# Patient Record
Sex: FEMALE | Race: WHITE | URBAN - METROPOLITAN AREA
[De-identification: names, ages, dates, MRNs, and addresses within clinical notes are randomized per-mention and may not be internally consistent; named-entity substitution may affect disease eponyms.]

---

## 2017-07-18 ENCOUNTER — INPATIENT (INPATIENT)
Facility: HOSPITAL | Age: 82
LOS: 2 days | Discharge: HOME HEALTH | End: 2017-07-21
Attending: SURGERY | Admitting: INTERNAL MEDICINE

## 2017-07-18 DIAGNOSIS — K56.609 UNSPECIFIED INTESTINAL OBSTRUCTION, UNSPECIFIED AS TO PARTIAL VERSUS COMPLETE OBSTRUCTION: ICD-10-CM

## 2017-07-25 DIAGNOSIS — K56.60 UNSPECIFIED INTESTINAL OBSTRUCTION: ICD-10-CM

## 2017-07-25 DIAGNOSIS — E87.1 HYPO-OSMOLALITY AND HYPONATREMIA: ICD-10-CM

## 2017-07-25 DIAGNOSIS — N13.8 OTHER OBSTRUCTIVE AND REFLUX UROPATHY: ICD-10-CM

## 2017-07-25 DIAGNOSIS — I10 ESSENTIAL (PRIMARY) HYPERTENSION: ICD-10-CM

## 2017-07-25 DIAGNOSIS — E03.9 HYPOTHYROIDISM, UNSPECIFIED: ICD-10-CM

## 2017-07-25 DIAGNOSIS — Q43.3 CONGENITAL MALFORMATIONS OF INTESTINAL FIXATION: ICD-10-CM

## 2017-07-25 DIAGNOSIS — E78.00 PURE HYPERCHOLESTEROLEMIA, UNSPECIFIED: ICD-10-CM

## 2017-07-25 DIAGNOSIS — K56.5 INTESTINAL ADHESIONS [BANDS] WITH OBSTRUCTION (POSTINFECTION): ICD-10-CM

## 2017-07-25 DIAGNOSIS — E11.9 TYPE 2 DIABETES MELLITUS WITHOUT COMPLICATIONS: ICD-10-CM

## 2017-07-25 PROBLEM — Z00.00 ENCOUNTER FOR PREVENTIVE HEALTH EXAMINATION: Status: ACTIVE | Noted: 2017-07-25

## 2017-07-28 DIAGNOSIS — K55.041: ICD-10-CM

## 2017-08-03 ENCOUNTER — APPOINTMENT (OUTPATIENT)
Dept: SURGERY | Facility: CLINIC | Age: 82
End: 2017-08-03
Payer: MEDICARE

## 2017-08-03 VITALS
SYSTOLIC BLOOD PRESSURE: 120 MMHG | DIASTOLIC BLOOD PRESSURE: 64 MMHG | WEIGHT: 138 LBS | BODY MASS INDEX: 24.45 KG/M2 | HEIGHT: 63 IN

## 2017-08-03 DIAGNOSIS — Z87.19 PERSONAL HISTORY OF OTHER DISEASES OF THE DIGESTIVE SYSTEM: ICD-10-CM

## 2017-08-03 PROCEDURE — 99024 POSTOP FOLLOW-UP VISIT: CPT | Mod: NC

## 2018-09-11 ENCOUNTER — OUTPATIENT (OUTPATIENT)
Dept: OUTPATIENT SERVICES | Facility: HOSPITAL | Age: 83
LOS: 1 days | Discharge: HOME | End: 2018-09-11

## 2018-09-11 DIAGNOSIS — T82.03XA LEAKAGE OF HEART VALVE PROSTHESIS, INITIAL ENCOUNTER: ICD-10-CM

## 2019-04-30 ENCOUNTER — EMERGENCY (EMERGENCY)
Facility: HOSPITAL | Age: 84
LOS: 0 days | Discharge: HOME | End: 2019-04-30
Attending: EMERGENCY MEDICINE | Admitting: EMERGENCY MEDICINE
Payer: MEDICARE

## 2019-04-30 VITALS
DIASTOLIC BLOOD PRESSURE: 95 MMHG | TEMPERATURE: 96 F | HEART RATE: 73 BPM | SYSTOLIC BLOOD PRESSURE: 179 MMHG | OXYGEN SATURATION: 97 % | RESPIRATION RATE: 18 BRPM

## 2019-04-30 VITALS
HEIGHT: 63 IN | TEMPERATURE: 97 F | RESPIRATION RATE: 18 BRPM | OXYGEN SATURATION: 100 % | DIASTOLIC BLOOD PRESSURE: 84 MMHG | WEIGHT: 136.91 LBS | HEART RATE: 76 BPM | SYSTOLIC BLOOD PRESSURE: 204 MMHG

## 2019-04-30 DIAGNOSIS — Z90.49 ACQUIRED ABSENCE OF OTHER SPECIFIED PARTS OF DIGESTIVE TRACT: Chronic | ICD-10-CM

## 2019-04-30 DIAGNOSIS — I10 ESSENTIAL (PRIMARY) HYPERTENSION: ICD-10-CM

## 2019-04-30 DIAGNOSIS — R10.9 UNSPECIFIED ABDOMINAL PAIN: ICD-10-CM

## 2019-04-30 DIAGNOSIS — K57.92 DIVERTICULITIS OF INTESTINE, PART UNSPECIFIED, WITHOUT PERFORATION OR ABSCESS WITHOUT BLEEDING: ICD-10-CM

## 2019-04-30 DIAGNOSIS — E78.5 HYPERLIPIDEMIA, UNSPECIFIED: ICD-10-CM

## 2019-04-30 LAB
ALBUMIN SERPL ELPH-MCNC: 4.7 G/DL — SIGNIFICANT CHANGE UP (ref 3.5–5.2)
ALP SERPL-CCNC: 57 U/L — SIGNIFICANT CHANGE UP (ref 30–115)
ALT FLD-CCNC: 12 U/L — SIGNIFICANT CHANGE UP (ref 0–41)
ANION GAP SERPL CALC-SCNC: 11 MMOL/L — SIGNIFICANT CHANGE UP (ref 7–14)
APPEARANCE UR: CLEAR — SIGNIFICANT CHANGE UP
AST SERPL-CCNC: 19 U/L — SIGNIFICANT CHANGE UP (ref 0–41)
BASE EXCESS BLDV CALC-SCNC: 3.3 MMOL/L — HIGH (ref -2–2)
BASOPHILS # BLD AUTO: 0.05 K/UL — SIGNIFICANT CHANGE UP (ref 0–0.2)
BASOPHILS NFR BLD AUTO: 0.9 % — SIGNIFICANT CHANGE UP (ref 0–1)
BILIRUB SERPL-MCNC: 0.3 MG/DL — SIGNIFICANT CHANGE UP (ref 0.2–1.2)
BILIRUB UR-MCNC: NEGATIVE — SIGNIFICANT CHANGE UP
BUN SERPL-MCNC: 21 MG/DL — HIGH (ref 10–20)
CA-I SERPL-SCNC: 1.22 MMOL/L — SIGNIFICANT CHANGE UP (ref 1.12–1.3)
CALCIUM SERPL-MCNC: 9.6 MG/DL — SIGNIFICANT CHANGE UP (ref 8.5–10.1)
CHLORIDE SERPL-SCNC: 102 MMOL/L — SIGNIFICANT CHANGE UP (ref 98–110)
CO2 SERPL-SCNC: 28 MMOL/L — SIGNIFICANT CHANGE UP (ref 17–32)
COLOR SPEC: YELLOW — SIGNIFICANT CHANGE UP
CREAT SERPL-MCNC: 0.8 MG/DL — SIGNIFICANT CHANGE UP (ref 0.7–1.5)
DIFF PNL FLD: NEGATIVE — SIGNIFICANT CHANGE UP
EOSINOPHIL # BLD AUTO: 0.06 K/UL — SIGNIFICANT CHANGE UP (ref 0–0.7)
EOSINOPHIL NFR BLD AUTO: 1.1 % — SIGNIFICANT CHANGE UP (ref 0–8)
GAS PNL BLDV: 137 MMOL/L — SIGNIFICANT CHANGE UP (ref 136–145)
GAS PNL BLDV: SIGNIFICANT CHANGE UP
GLUCOSE SERPL-MCNC: 134 MG/DL — HIGH (ref 70–99)
GLUCOSE UR QL: NEGATIVE MG/DL — SIGNIFICANT CHANGE UP
HCO3 BLDV-SCNC: 31 MMOL/L — HIGH (ref 22–29)
HCT VFR BLD CALC: 39.5 % — SIGNIFICANT CHANGE UP (ref 37–47)
HCT VFR BLDA CALC: 42.1 % — SIGNIFICANT CHANGE UP (ref 34–44)
HGB BLD CALC-MCNC: 13.7 G/DL — LOW (ref 14–18)
HGB BLD-MCNC: 12.8 G/DL — SIGNIFICANT CHANGE UP (ref 12–16)
IMM GRANULOCYTES NFR BLD AUTO: 0.5 % — HIGH (ref 0.1–0.3)
KETONES UR-MCNC: NEGATIVE — SIGNIFICANT CHANGE UP
LACTATE BLDV-MCNC: 1.3 MMOL/L — SIGNIFICANT CHANGE UP (ref 0.5–1.6)
LEUKOCYTE ESTERASE UR-ACNC: NEGATIVE — SIGNIFICANT CHANGE UP
LYMPHOCYTES # BLD AUTO: 1.08 K/UL — LOW (ref 1.2–3.4)
LYMPHOCYTES # BLD AUTO: 19.2 % — LOW (ref 20.5–51.1)
MCHC RBC-ENTMCNC: 29.9 PG — SIGNIFICANT CHANGE UP (ref 27–31)
MCHC RBC-ENTMCNC: 32.4 G/DL — SIGNIFICANT CHANGE UP (ref 32–37)
MCV RBC AUTO: 92.3 FL — SIGNIFICANT CHANGE UP (ref 81–99)
MONOCYTES # BLD AUTO: 0.39 K/UL — SIGNIFICANT CHANGE UP (ref 0.1–0.6)
MONOCYTES NFR BLD AUTO: 6.9 % — SIGNIFICANT CHANGE UP (ref 1.7–9.3)
NEUTROPHILS # BLD AUTO: 4.01 K/UL — SIGNIFICANT CHANGE UP (ref 1.4–6.5)
NEUTROPHILS NFR BLD AUTO: 71.4 % — SIGNIFICANT CHANGE UP (ref 42.2–75.2)
NITRITE UR-MCNC: NEGATIVE — SIGNIFICANT CHANGE UP
NRBC # BLD: 0 /100 WBCS — SIGNIFICANT CHANGE UP (ref 0–0)
PCO2 BLDV: 58 MMHG — HIGH (ref 41–51)
PH BLDV: 7.33 — SIGNIFICANT CHANGE UP (ref 7.26–7.43)
PH UR: 6.5 — SIGNIFICANT CHANGE UP (ref 5–8)
PLATELET # BLD AUTO: 214 K/UL — SIGNIFICANT CHANGE UP (ref 130–400)
PO2 BLDV: 16 MMHG — LOW (ref 20–40)
POTASSIUM BLDV-SCNC: 4.7 MMOL/L — SIGNIFICANT CHANGE UP (ref 3.3–5.6)
POTASSIUM SERPL-MCNC: 5.2 MMOL/L — HIGH (ref 3.5–5)
POTASSIUM SERPL-SCNC: 5.2 MMOL/L — HIGH (ref 3.5–5)
PROT SERPL-MCNC: 7.3 G/DL — SIGNIFICANT CHANGE UP (ref 6–8)
PROT UR-MCNC: NEGATIVE MG/DL — SIGNIFICANT CHANGE UP
RBC # BLD: 4.28 M/UL — SIGNIFICANT CHANGE UP (ref 4.2–5.4)
RBC # FLD: 12.6 % — SIGNIFICANT CHANGE UP (ref 11.5–14.5)
SAO2 % BLDV: 20 % — SIGNIFICANT CHANGE UP
SODIUM SERPL-SCNC: 141 MMOL/L — SIGNIFICANT CHANGE UP (ref 135–146)
SP GR SPEC: 1.01 — SIGNIFICANT CHANGE UP (ref 1.01–1.03)
UROBILINOGEN FLD QL: 0.2 MG/DL — SIGNIFICANT CHANGE UP (ref 0.2–0.2)
WBC # BLD: 5.62 K/UL — SIGNIFICANT CHANGE UP (ref 4.8–10.8)
WBC # FLD AUTO: 5.62 K/UL — SIGNIFICANT CHANGE UP (ref 4.8–10.8)

## 2019-04-30 PROCEDURE — 74177 CT ABD & PELVIS W/CONTRAST: CPT | Mod: 26

## 2019-04-30 PROCEDURE — 99284 EMERGENCY DEPT VISIT MOD MDM: CPT

## 2019-04-30 RX ORDER — ACETAMINOPHEN 500 MG
650 TABLET ORAL ONCE
Qty: 0 | Refills: 0 | Status: COMPLETED | OUTPATIENT
Start: 2019-04-30 | End: 2019-04-30

## 2019-04-30 RX ORDER — IOHEXOL 300 MG/ML
30 INJECTION, SOLUTION INTRAVENOUS ONCE
Qty: 0 | Refills: 0 | Status: COMPLETED | OUTPATIENT
Start: 2019-04-30 | End: 2019-04-30

## 2019-04-30 RX ORDER — CIPROFLOXACIN LACTATE 400MG/40ML
1 VIAL (ML) INTRAVENOUS
Qty: 14 | Refills: 0
Start: 2019-04-30 | End: 2019-05-06

## 2019-04-30 RX ORDER — METRONIDAZOLE 500 MG
1 TABLET ORAL
Qty: 21 | Refills: 0
Start: 2019-04-30 | End: 2019-05-06

## 2019-04-30 RX ADMIN — Medication 650 MILLIGRAM(S): at 12:09

## 2019-04-30 RX ADMIN — IOHEXOL 30 MILLILITER(S): 300 INJECTION, SOLUTION INTRAVENOUS at 12:09

## 2019-04-30 NOTE — ED PROVIDER NOTE - OBJECTIVE STATEMENT
85 yo F with hx of diverticulitis s/p partial colon resection in 2017 (Dr. 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 No recent trauma or heavy lifting. No known alleviating or aggravating factors. No associated nausea, vomiting, fever, chills, cp, sob, dysuria, hematuria, diarrhea, constipation. Had normal soft BM earlier this AM prior to onset and has been passing gas. Has not taken anything for pain, states pain is currently improved but still present.

## 2019-04-30 NOTE — ED ADULT NURSE NOTE - OBJECTIVE STATEMENT
patient states woke up this morning had breakfast and later on felt a " cramp" in b/l lower abdomen that lasted few seconds and went away. patient denies abdomen pain at this time. denies n/v/f/d/son/chest pain no blle edema. states has normal bowel movement this morning

## 2019-04-30 NOTE — ED PROVIDER NOTE - CLINICAL SUMMARY MEDICAL DECISION MAKING FREE TEXT BOX
pw low abd pain, CT read as negative, however on my interpretation and with patient symptoms likely diverticulitis or colitis of small segment without complication. Patient to be discharged from ED. Any available test results were discussed with patient and/or family. Verbal instructions given, including instructions to return to ED immediately for any new, worsening, or concerning symptoms. Patient endorsed understanding. Written discharge instructions additionally given, including follow-up plan.

## 2019-04-30 NOTE — ED PROVIDER NOTE - NS ED ROS FT
GEN:  no fever, no chills  NEURO:  no headache, no dizziness  ENT: no sore throat, no runny nose  CV:  no chest pain, no SOB  RESP:  no dyspnea, no cough  GI:  no nausea, no vomiting, + abdominal pain, no diarrhea, no constipation  :  no dysuria, no urinary frequency, no hematuria  MSK:  no joint pain, no edema  SKIN:  no rash, no bruising  HEME: no hematochezia, no melena

## 2019-04-30 NOTE — ED PROVIDER NOTE - IMAGING STUDIES ADDITIONAL INFORMATION FREE TEXT
CT showing extensive diverticulosis with some hyperenhancement of diverticulae by the rectosigmoid jct, without fluid collection or abscess or signs of obstruction.

## 2019-04-30 NOTE — ED PROVIDER NOTE - PHYSICAL EXAMINATION
CONSTITUTIONAL: well developed, nontoxic appearing, in no acute distress, speaking in full sentences  SKIN: warm, dry, no rash, cap refill < 2 seconds  HEENT: normocephalic, atraumatic, no conjunctival erythema, moist mucous membranes, patent airway  NECK: supple, no masses  CV:  regular rate, regular rhythm  RESP: no wheezes, no rales, no rhonchi, normal work of breathing  ABD: soft, periumbilical tenderness, no suprapubic tenderness, nondistended, no rebound, no guarding  BACK: no CVA tenderness  MSK: normal ROM, no cyanosis, no edema  NEURO: alert, oriented, grossly unremarkable  PSYCH: cooperative, appropriate

## 2019-04-30 NOTE — ED PROVIDER NOTE - ATTENDING CONTRIBUTION TO CARE
83 yo F with hx of diverticulitis s/p partial colon resection in 2017 (Dr. Palomares), HTN, HLD, pre-DM, SSC s/p resection, pw low abd pain, sudden onset this morning at 10 am, worsening with standing, not affected by activity or exertion, Has not passed gas since onset though only endured for a short period so far. No black or bloody stools, dysuria, hematuria, diarrhea, vomiting, chest pain, SOB, leg swelling, recent travel, or other complaint.  Exam: NAD, NCAT, HEENT: mmm, EOMI, PERRLA, Neck: supple, nontender, nl ROM, Heart: RRR, no murmur, Lungs: BCTA, no signs of increased WOB, Abd: + LLQ>RLQ abd tenderness to palp, no guarding or rebound, no hernia palpated, no CVAT. MSK: chest, back, and ext nontender, nl rom, no deformity. Neuro: A&Ox3, normal strength, nl sensation throughout, normal speech.  A/P: Eval possible recurrent diverticulitis vs possible bowel obstruction vs other acute intraabdominal pathology. Labs, imaging, fluids, symptom control, reassess.

## 2019-05-01 LAB
CULTURE RESULTS: SIGNIFICANT CHANGE UP
SPECIMEN SOURCE: SIGNIFICANT CHANGE UP

## 2019-12-09 PROBLEM — E78.5 HYPERLIPIDEMIA, UNSPECIFIED: Chronic | Status: ACTIVE | Noted: 2019-04-30

## 2019-12-09 PROBLEM — I10 ESSENTIAL (PRIMARY) HYPERTENSION: Chronic | Status: ACTIVE | Noted: 2019-04-30

## 2020-01-29 ENCOUNTER — OUTPATIENT (OUTPATIENT)
Dept: OUTPATIENT SERVICES | Facility: HOSPITAL | Age: 85
LOS: 1 days | Discharge: HOME | End: 2020-01-29
Payer: MEDICARE

## 2020-01-29 DIAGNOSIS — Z90.49 ACQUIRED ABSENCE OF OTHER SPECIFIED PARTS OF DIGESTIVE TRACT: Chronic | ICD-10-CM

## 2020-01-29 PROCEDURE — 93312 ECHO TRANSESOPHAGEAL: CPT | Mod: 26

## 2020-01-29 PROCEDURE — 93010 ELECTROCARDIOGRAM REPORT: CPT

## 2020-01-29 PROCEDURE — 93320 DOPPLER ECHO COMPLETE: CPT | Mod: 26

## 2020-01-29 PROCEDURE — 93325 DOPPLER ECHO COLOR FLOW MAPG: CPT | Mod: 26

## 2020-01-29 RX ORDER — AMLODIPINE BESYLATE 2.5 MG/1
1 TABLET ORAL
Qty: 0 | Refills: 0 | DISCHARGE

## 2020-01-29 RX ORDER — AMLODIPINE BESYLATE 2.5 MG/1
0 TABLET ORAL
Qty: 0 | Refills: 0 | DISCHARGE

## 2020-01-29 RX ORDER — LEVOTHYROXINE SODIUM 125 MCG
0 TABLET ORAL
Qty: 0 | Refills: 0 | DISCHARGE

## 2020-01-29 RX ORDER — QUINAPRIL HYDROCHLORIDE 40 MG/1
0 TABLET, FILM COATED ORAL
Qty: 0 | Refills: 0 | DISCHARGE

## 2020-01-29 NOTE — CHART NOTE - NSCHARTNOTEFT_GEN_A_CORE
POST OPERATIVE PROCEDURAL DOCUMENTATION  PRE-OP DIAGNOSIS: Aortic stenosis    POST-OP DIAGNOSIS: Mild aortic stenosis    PROCEDURE: Transesophageal echocardiogram    Primary Physician: Dr. Osborne  Assistant: Clotilde    ANESTHESIA TYPE  [  ] General Anesthesia  [ x ] Conscious Sedation  [  ] Local/Regional    CONDITION  [  ] Critical  [  ] Serious  [  ] Fair  [x] Good    SPECIMENS REMOVED (IF APPLICABLE): N/A    IMPLANTS (IF APPLICABLE): None    ESTIMATED BLOOD LOSS: None    COMPLICATIONS: None      FINDINGS:    After risks and benefits of procedures were explained, informed consent was obtained and placed in chart. Refer to Anesthesia note for sedation details.  The DARYN probe was passed into the esophagus without difficulty.  Transesophageal and transgastric images were obtained.  The DARYN probe was removed without difficulty and examined.  There was no evidence for bleeding.  The patient tolerated the procedure well without any immediate DARYN-related complications.      Preliminary Findings:  LA and RA: Enlarged.  LV: LVEF was estimated at 55-65%  MV: Severely thickened and calcified mitral valve leaflets. Calcified annulus. Mild MR. No evidence for MS.   AV: Thickened, calcified trileaflet. Mild AI, Mild AS. Aortic valve area 1.6 cm^2 by planimetry.  TV: Mild TR.  PV: Trace PI.   IAS: no PFO. NO R-> L shunt on color doppler or   There was mild, non-mobile atheroma seen in the thoracic aorta.     DIAGNOSIS/IMPRESSION:    PLAN OF CARE:  [x] Continue with current medications  [x] Discharge home when stable and awake POST OPERATIVE PROCEDURAL DOCUMENTATION  PRE-OP DIAGNOSIS: Aortic stenosis    POST-OP DIAGNOSIS: Mild aortic stenosis    PROCEDURE: Transesophageal echocardiogram and transthoracic echocardiogram to measure gradients.    Primary Physician: Dr. Osborne  Assistant: Clotilde    ANESTHESIA TYPE  [  ] General Anesthesia  [ x ] Conscious Sedation  [  ] Local/Regional    CONDITION  [  ] Critical  [  ] Serious  [  ] Fair  [x] Good    SPECIMENS REMOVED (IF APPLICABLE): N/A    IMPLANTS (IF APPLICABLE): None    ESTIMATED BLOOD LOSS: None    COMPLICATIONS: None      FINDINGS:    After risks and benefits of procedures were explained, informed consent was obtained and placed in chart. Refer to Anesthesia note for sedation details.  The DARYN probe was passed into the esophagus without difficulty.  Transesophageal and transgastric images were obtained.  The DARYN probe was removed without difficulty and examined.  There was no evidence for bleeding.  The patient tolerated the procedure well without any immediate DARYN-related complications.      Preliminary Findings:  LA and RA: Enlarged.  LV: LVEF was estimated at 55-65%  MV: Severely thickened and calcified mitral valve leaflets. Calcified annulus. Mild MR. No evidence for MS.   AV: Thickened, calcified trileaflet. Mild AI. Mild AS by aortic valve area 1.6 cm^2 by planimetry and 1.6cm^2 by VTI. Aortic gradient mean 12.4 mmHg, peak 23.90 mmHg.  TV: Mild TR.  PV: Trace PI.   IAS: No PFO. NO R-> L shunt on color doppler and injection of agitated saline contrast.  There was mild, non-mobile atheroma seen in the thoracic aorta.     DIAGNOSIS/IMPRESSION: Mild aortic stenosis.     PLAN OF CARE:  [x] Continue with current medications  [x] Discharge home when stable and awake POST OPERATIVE PROCEDURAL DOCUMENTATION  PRE-OP DIAGNOSIS: Aortic stenosis    POST-OP DIAGNOSIS: Mild aortic stenosis    PROCEDURE: Transesophageal echocardiogram and transthoracic echocardiogram to measure gradients.    Primary Physician: Dr. Osborne  Assistant: Clotilde    ANESTHESIA TYPE  [  ] General Anesthesia  [ x ] Conscious Sedation  [  ] Local/Regional    CONDITION  [  ] Critical  [  ] Serious  [  ] Fair  [x] Good    SPECIMENS REMOVED (IF APPLICABLE): N/A    IMPLANTS (IF APPLICABLE): None    ESTIMATED BLOOD LOSS: None    COMPLICATIONS: None      FINDINGS:    After risks and benefits of procedures were explained, informed consent was obtained and placed in chart. Refer to Anesthesia note for sedation details.  The DARYN probe was passed into the esophagus without difficulty.  Transesophageal and transgastric images were obtained.  The DARYN probe was removed without difficulty and examined.  There was no evidence for bleeding.  The patient tolerated the procedure well without any immediate DARYN-related complications.      Preliminary Findings:  LA and RA: Enlarged.  LV: LVEF was estimated at 55-65%  MV: Severely thickened and calcified mitral valve leaflets. Calcified annulus. Mild MR. No evidence for MS.   AV: Thickened, calcified trileaflet. Mild AI. Mild AS by aortic valve area 1.6 cm^2 by planimetry and 1.6cm^2 by VTI. Aortic gradient mean 12.4 mmHg, peak 23.90 mmHg.  TV: Mild TR.  PV: Trace PI.   IAS: No PFO. NO R-> L shunt on color doppler and injection of agitated saline contrast.  There was mild, non-mobile atheroma seen in the thoracic aorta.     DIAGNOSIS/IMPRESSION: Mild aortic stenosis.     PLAN OF CARE:  [x] Continue with current medications.  [x] Discharge home when stable and awake POST OPERATIVE PROCEDURAL DOCUMENTATION  PRE-OP DIAGNOSIS: Aortic stenosis    POST-OP DIAGNOSIS: Mild aortic stenosis    PROCEDURE: Transesophageal echocardiogram and transthoracic echocardiogram to measure gradients.    Primary Physician: Dr. Osborne  Assistant: Clotilde    ANESTHESIA TYPE  [  ] General Anesthesia  [ x ] Conscious Sedation  [  ] Local/Regional    CONDITION  [  ] Critical  [  ] Serious  [  ] Fair  [x] Good    SPECIMENS REMOVED (IF APPLICABLE): N/A    IMPLANTS (IF APPLICABLE): None    ESTIMATED BLOOD LOSS: None    COMPLICATIONS: None      FINDINGS:    After risks and benefits of procedures were explained, informed consent was obtained and placed in chart. Refer to Anesthesia note for sedation details.  The DARYN probe was passed into the esophagus without difficulty.  Transesophageal and transgastric images were obtained.  The DARYN probe was removed without difficulty and examined.  There was no evidence for bleeding.  The patient tolerated the procedure well without any immediate DARYN-related complications.      Preliminary Findings:  LA and RA: Enlarged.  LV: LVEF was estimated at 55-65%  MV: Severely thickened and calcified mitral valve leaflets. Calcified annulus. Mild MR. No evidence for MS.   AV: Degenerative, thickened, calcified trileaflet. Mild AI. Mild AS by aortic valve area 1.6 cm^2 by planimetry and 1.6cm^2 by VTI. Aortic gradient mean 12.4 mmHg, peak 23.90 mmHg.  TV: Mild TR.  PV: Trace PI.   IAS: No PFO. NO R-> L shunt on color doppler and injection of agitated saline contrast.  There was mild, non-mobile atheroma seen in the thoracic aorta.     DIAGNOSIS/IMPRESSION: Mild aortic stenosis.     PLAN OF CARE:  [x] Continue with current medications.  [x] Discharge home when stable and awake POST OPERATIVE PROCEDURAL DOCUMENTATION  PRE-OP DIAGNOSIS: Aortic stenosis    POST-OP DIAGNOSIS: Mild aortic stenosis. Mild aortic regurgitation. Mild mitral regurgitation.    PROCEDURE: Transesophageal echocardiogram and transthoracic echocardiogram to measure gradients.    Primary Physician: Dr. Osborne  Assistant: Clotilde    ANESTHESIA TYPE  [  ] General Anesthesia  [ x ] Conscious Sedation  [  ] Local/Regional    CONDITION  [  ] Critical  [  ] Serious  [  ] Fair  [x] Good    SPECIMENS REMOVED (IF APPLICABLE): N/A    IMPLANTS (IF APPLICABLE): None    ESTIMATED BLOOD LOSS: None    COMPLICATIONS: None      FINDINGS:    After risks and benefits of procedures were explained, informed consent was obtained and placed in chart. Refer to Anesthesia note for sedation details.  The DARYN probe was passed into the esophagus without difficulty.  Transesophageal and transgastric images were obtained.  The DARYN probe was removed without difficulty and examined.  There was no evidence for bleeding.  The patient tolerated the procedure well without any immediate DARYN-related complications.      Preliminary Findings:  LA and RA: Enlarged.  LV: LVEF was estimated at 55-65%  MV: Severely thickened and calcified mitral valve leaflets. Calcified annulus. Mild MR. No evidence for MS.   AV: Degenerative, thickened, calcified trileaflet. Mild AI. Mild AS by aortic valve area 1.6 cm^2 by planimetry and 1.6cm^2 by VTI. Aortic gradient mean 12.4 mmHg, peak 23.90 mmHg.  TV: Mild TR.  PV: Trace PI.   IAS: No PFO. NO R-> L shunt on color doppler and injection of agitated saline contrast.  There was mild, non-mobile atheroma seen in the thoracic aorta.     DIAGNOSIS/IMPRESSION: Mild aortic stenosis. Mild aortic regurgitation. Mild mitral regurgitation.    PLAN OF CARE:  [x] Continue with current medications.  [x] Discharge home when stable and awake. POST OPERATIVE PROCEDURAL DOCUMENTATION  PRE-OP DIAGNOSIS: Aortic stenosis    POST-OP DIAGNOSIS: Mild aortic stenosis. Mild aortic regurgitation. Mild mitral regurgitation.    PROCEDURE: Transesophageal echocardiogram and transthoracic echocardiogram to measure gradients.    Primary Physician: Dr. Osborne  Assistant: Clotilde    ANESTHESIA TYPE  [  ] General Anesthesia  [ x ] Conscious Sedation  [  ] Local/Regional    CONDITION  [  ] Critical  [  ] Serious  [  ] Fair  [x] Good    SPECIMENS REMOVED (IF APPLICABLE): N/A    IMPLANTS (IF APPLICABLE): None    ESTIMATED BLOOD LOSS: None    COMPLICATIONS: None      FINDINGS:    After risks and benefits of procedures were explained, informed consent was obtained and placed in chart. Refer to Anesthesia note for sedation details.  The DARYN probe was passed into the esophagus without difficulty.  Transesophageal and transgastric images were obtained.  The DARYN probe was removed without difficulty and examined.  There was no evidence for bleeding.  The patient tolerated the procedure well without any immediate DARYN-related complications.      Preliminary Findings:  LA and RA: Enlarged.  LV: LVEF was estimated at 55-65%  MV: Severely thickened and calcified mitral valve leaflets. Calcified annulus. Mild MR. No evidence for MS.   AV: Degenerative, thickened, calcified trileaflet. Mild AI. Mild AS by aortic valve area 1.6 cm^2 by planimetry and 1.6cm^2 by continuity equation. Aortic gradient mean 12.4 mmHg, peak 23.90 mmHg.  TV: Mild TR.  PV: Trace PI.   IAS: No PFO. NO R-> L shunt on color doppler and injection of agitated saline contrast.  There was mild, non-mobile atheroma seen in the thoracic aorta.     DIAGNOSIS/IMPRESSION: Mild aortic stenosis. Mild aortic regurgitation. Mild mitral regurgitation.    PLAN OF CARE:  [x] Continue with current medications.  [x] Discharge home when stable and awake.

## 2020-01-29 NOTE — H&P CARDIOLOGY - HISTORY OF PRESENT ILLNESS
83 y/o F with DLD, HTN, Hypothyroidism, SCC over ear s/p resection, h/o diveriticulitis s/p bowel resection, h/o aortic stenosis, recent fall vs. syncope in store parking lot presented for DARYN for further evaluation of aortic valve    Hct 40.5  Cr 0.92  EKG   Normal sinus rhythm 83 y/o F with DLD, HTN, Hypothyroidism, SCC over ear s/p resection, h/o diveriticulitis s/p bowel resection, h/o aortic stenosis, recent fall vs. syncope in store parking lot presented for DARYN for further evaluation of aortic valve.    Hct 40.5  Cr 0.92  EKG   Normal sinus rhythm

## 2020-01-30 DIAGNOSIS — I35.0 NONRHEUMATIC AORTIC (VALVE) STENOSIS: ICD-10-CM

## 2020-03-10 ENCOUNTER — APPOINTMENT (OUTPATIENT)
Dept: CARDIOLOGY | Facility: CLINIC | Age: 85
End: 2020-03-10
Payer: MEDICARE

## 2020-03-10 VITALS
WEIGHT: 138 LBS | HEIGHT: 63 IN | DIASTOLIC BLOOD PRESSURE: 70 MMHG | HEART RATE: 65 BPM | SYSTOLIC BLOOD PRESSURE: 108 MMHG | BODY MASS INDEX: 24.45 KG/M2

## 2020-03-10 DIAGNOSIS — Z86.39 PERSONAL HISTORY OF OTHER ENDOCRINE, NUTRITIONAL AND METABOLIC DISEASE: ICD-10-CM

## 2020-03-10 DIAGNOSIS — Z85.828 PERSONAL HISTORY OF OTHER MALIGNANT NEOPLASM OF SKIN: ICD-10-CM

## 2020-03-10 DIAGNOSIS — Z82.49 FAMILY HISTORY OF ISCHEMIC HEART DISEASE AND OTHER DISEASES OF THE CIRCULATORY SYSTEM: ICD-10-CM

## 2020-03-10 PROBLEM — E03.9 HYPOTHYROIDISM, UNSPECIFIED: Chronic | Status: ACTIVE | Noted: 2020-01-29

## 2020-03-10 PROCEDURE — 99204 OFFICE O/P NEW MOD 45 MIN: CPT

## 2020-03-10 PROCEDURE — 93228 REMOTE 30 DAY ECG REV/REPORT: CPT

## 2020-03-10 PROCEDURE — 93000 ELECTROCARDIOGRAM COMPLETE: CPT | Mod: 59

## 2020-03-10 NOTE — END OF VISIT
[FreeTextEntry3] : I was present with the NP/PA during the history and exam of the patient. I discussed patient's management with her in details.I reviewed the NP’s note and agree with the documented findings and plan of care. I would like to take this opportunity to thank you for involving me in this patient care. Please do not hesitate to contact me if you have any further questions at [phone].\par  [>50% of Time Spent on Counseling for ____] : Greater than 50% of the encounter time was spent on counseling for [unfilled] [Time Spent: ___ minutes] : I have spent [unfilled] minutes of face to face time with the patient

## 2020-03-10 NOTE — PHYSICAL EXAM
[General Appearance - Well Developed] : well developed [Normal Appearance] : normal appearance [Well Groomed] : well groomed [General Appearance - Well Nourished] : well nourished [No Deformities] : no deformities [General Appearance - In No Acute Distress] : no acute distress [Normal Conjunctiva] : the conjunctiva exhibited no abnormalities [Normal Oral Mucosa] : normal oral mucosa [Heart Rate And Rhythm] : heart rate and rhythm were normal [] : no respiratory distress [Bowel Sounds] : normal bowel sounds [Abnormal Walk] : normal gait [Nail Clubbing] : no clubbing of the fingernails [Skin Color & Pigmentation] : normal skin color and pigmentation [Oriented To Time, Place, And Person] : oriented to person, place, and time [No Anxiety] : not feeling anxious [FreeTextEntry1] : no jvd

## 2020-03-10 NOTE — HISTORY OF PRESENT ILLNESS
[FreeTextEntry1] : Referring Cardiologist: Dr. Daniel Abbasi\par Neuro: Dr. Mccullough\par \par To Establish care\par H/o fall in Sept not sure if syncopal or mechanical \par found to have PVC in the 24 hours event monitor\par She has no chest pain, no shortness of breath, no dyspnea on exertion, no orthopnea, no PND. She denies dizziness. She has no exertional symptoms. She presents for evaluation.\par \par \par CARDIAC TESTING:\par ECG (3/10/2020) 80 sinus rhythm GA 156ms, QRS 80ms, QTC 428ms\par ECHO (1/9/2020) LVEF 55-65% Aortic stenosis\par DARYN ( 1/29/2020) AO stenosis 1.3 valve area\par As per note from Dr Abbasi:\par CT of the head  - showed no acute pathology\par 24 hour holter - showed PVC\par Carotid duplex - negative for carotid stenosis\par Persantine -Thallium nuclear stress test - negative

## 2020-03-10 NOTE — REASON FOR VISIT
[Family Member] : family member [Other: _____] : [unfilled] [Initial Evaluation] : an initial evaluation of [Syncope] : syncope

## 2020-03-10 NOTE — DISCUSSION/SUMMARY
[FreeTextEntry1] : \par 85 years old well appearing woman with PMH HTN, Hypothyroidism, SCC of the Left ear s/p resection ,\par LDL, diverticulitis s/p bowel resection who in September 2019 had fallen, not sure if its syncopal or mechanical. Patient denied LOC, report she tripped and she got up right away with a broken tooth.She had seen Dr. Abbasi and fitted her with 24hr holter. \par \par \par Patient had 24 hours holter that showed PVCs\par Since she has no symptoms and cause of fall is unknown, I recommend a 4 weeks event monitor to evaluate  the etiology of her fall I educated the patient on the use of symptoms’ trigger feature of the event monitor. I will reassess patient after completion of the 4 weeks event monitor.\par \par Patient and daughter was given opportunity to ask questions and were answered to their satisfaction.\par \par RTO in 3 months\par \par I have also advised the patient to go to the nearest emergency room if he experiences any chest pain, dyspnea, syncope, or has any other compelling symptoms.\par \par

## 2020-03-16 ENCOUNTER — APPOINTMENT (OUTPATIENT)
Dept: UROGYNECOLOGY | Facility: CLINIC | Age: 85
End: 2020-03-16

## 2020-06-23 ENCOUNTER — APPOINTMENT (OUTPATIENT)
Dept: CARDIOLOGY | Facility: CLINIC | Age: 85
End: 2020-06-23
Payer: MEDICARE

## 2020-06-23 ENCOUNTER — APPOINTMENT (OUTPATIENT)
Dept: CARDIOLOGY | Facility: CLINIC | Age: 85
End: 2020-06-23

## 2020-06-23 VITALS
HEIGHT: 63 IN | BODY MASS INDEX: 22.86 KG/M2 | HEART RATE: 71 BPM | DIASTOLIC BLOOD PRESSURE: 69 MMHG | SYSTOLIC BLOOD PRESSURE: 112 MMHG | TEMPERATURE: 98.1 F | WEIGHT: 129 LBS

## 2020-06-23 DIAGNOSIS — I49.3 VENTRICULAR PREMATURE DEPOLARIZATION: ICD-10-CM

## 2020-06-23 DIAGNOSIS — I49.1 ATRIAL PREMATURE DEPOLARIZATION: ICD-10-CM

## 2020-06-23 DIAGNOSIS — Z78.9 OTHER SPECIFIED HEALTH STATUS: ICD-10-CM

## 2020-06-23 DIAGNOSIS — E78.5 HYPERLIPIDEMIA, UNSPECIFIED: ICD-10-CM

## 2020-06-23 DIAGNOSIS — I10 ESSENTIAL (PRIMARY) HYPERTENSION: ICD-10-CM

## 2020-06-23 PROCEDURE — 99214 OFFICE O/P EST MOD 30 MIN: CPT

## 2020-06-23 PROCEDURE — 93000 ELECTROCARDIOGRAM COMPLETE: CPT

## 2020-06-23 RX ORDER — ASPIRIN 81 MG
81 TABLET, DELAYED RELEASE (ENTERIC COATED) ORAL DAILY
Refills: 0 | Status: ACTIVE | COMMUNITY

## 2020-06-23 RX ORDER — LEVOTHYROXINE SODIUM 0.05 MG/1
50 TABLET ORAL DAILY
Refills: 0 | Status: ACTIVE | COMMUNITY

## 2020-08-02 PROBLEM — Z78.9 NON-SMOKER: Status: ACTIVE | Noted: 2020-08-02

## 2020-08-02 PROBLEM — E78.5 HYPERLIPIDEMIA, UNSPECIFIED HYPERLIPIDEMIA TYPE: Status: ACTIVE | Noted: 2020-08-02

## 2020-08-02 PROBLEM — I49.1 APC (ATRIAL PREMATURE CONTRACTIONS): Status: ACTIVE | Noted: 2020-08-02

## 2020-08-02 PROBLEM — I49.3 PVC (PREMATURE VENTRICULAR CONTRACTION): Status: ACTIVE | Noted: 2020-03-10

## 2020-08-02 PROBLEM — Z78.9 DOES NOT USE ILLICIT DRUGS: Status: ACTIVE | Noted: 2020-08-02

## 2020-08-02 RX ORDER — AMLODIPINE BESYLATE 5 MG/1
5 TABLET ORAL
Refills: 0 | Status: ACTIVE | COMMUNITY

## 2020-08-02 RX ORDER — PRAVASTATIN SODIUM 20 MG/1
20 TABLET ORAL DAILY
Refills: 0 | Status: ACTIVE | COMMUNITY

## 2020-08-02 NOTE — PHYSICAL EXAM
[General Appearance - Well Developed] : well developed [Well Groomed] : well groomed [Normal Appearance] : normal appearance [General Appearance - In No Acute Distress] : no acute distress [No Deformities] : no deformities [General Appearance - Well Nourished] : well nourished [Normal Conjunctiva] : the conjunctiva exhibited no abnormalities [Normal Oral Mucosa] : normal oral mucosa [] : no respiratory distress [Heart Rate And Rhythm] : heart rate and rhythm were normal [Bowel Sounds] : normal bowel sounds [Nail Clubbing] : no clubbing of the fingernails [Abnormal Walk] : normal gait [Skin Color & Pigmentation] : normal skin color and pigmentation [Oriented To Time, Place, And Person] : oriented to person, place, and time [No Anxiety] : not feeling anxious [FreeTextEntry1] : no jvd

## 2020-08-02 NOTE — DISCUSSION/SUMMARY
[FreeTextEntry1] : Ms. Snow Juan is a pleasant 85 years old well appearing woman with hypertension,hyperlipidemia, hypothyroidism, SCC of the Left ear s/p resection, LDL, diverticulitis s/p bowel resection who in September 2019 had fallen, not sure if its syncopal or mechanical. Patient denied LOC, report she tripped and she got up right away with a broken tooth. She had seen Dr. Abbasi and fitted her with 24hr holter. \par MCOT showed MCOT (3/10/2020 to 4/8/2020): 21 days monitored, average HR 67 bpm (range  bpm), no AF, no SVT, no pause, no VT, PVCs 2%, APCs <1%\par \par \par Patient had 24 hours holter that showed PVCs.\par I reviewed with patient the results of 30 days event monitor and I reassured her.\par \par Since the 30 days event monitor yield no result leading to the cause of syncope/fall\par I recommend an ILR implant for this patient for long term detection of atrial fibrillation. I discussed an ILR implantation with the patient in great detail. I discussed benefits such as monitoring the heart rate and rhythm for a prolonged period of time which could identify causes of ischemic changes on brain MRI and assist in establishing a diagnosis for future management and treatment. In addition, ILR implantation procedure as well as risks such as infection, bleeding and sensitivity to cardiac monitor material was discussed. Ample time was provided for questions/answers. Patient has expressed that he would like to move forward with an ILR implant. He was notified that our office will contact him to confirm scheduling date . I discussed remote monitoring and follow up device interrogations as well.\par \par Patient want to go ahead with the implant. July 15, 2020 was secured. The process for pre- op was discussed with patient and daughter; Lis \par \par \par I have also advised the patient to go to the nearest emergency room if he experiences any chest pain, dyspnea, syncope, or has any other compelling symptoms.\par \par

## 2020-08-02 NOTE — REASON FOR VISIT
[Family Member] : family member [Syncope] : syncope [Follow-Up - Clinic] : a clinic follow-up of [FreeTextEntry1] : discuss result of event monitor

## 2020-08-02 NOTE — HISTORY OF PRESENT ILLNESS
[FreeTextEntry1] : Referring Cardiologist: Dr. Daniel Abbasi\par Neuro: Dr. Mccullough\par PCP: Dr. Almanza\par \par Follow visit to discuss result of holter monitor\par H/o fall in Sept not sure if syncopal or mechanical \par found to have PVC in the 24 hours event monitor\par She has no chest pain, no shortness of breath, no dyspnea on exertion, no orthopnea, no PND. She denies dizziness. She has no exertional symptoms. \par \par CARDIAC TESTING:\par ECG ( 6/23/2020) 71bpm sinus rhythm ME 156ms, QRS 82ms, QTC 400ms\par ECG (3/10/2020) 80 sinus rhythm ME 156ms, QRS 80ms, QTC 428ms\par MCOT (3/10/2020 to 4/8/2020): 21 days monitored, average HR 67 bpm (range  bpm), no AF, no SVT, no pause, no VT, PVCs 2%, APCs <1%\par ECHO (1/9/2020) LVEF 55-65% Aortic stenosis\par DARYN ( 1/29/2020) AO stenosis 1.3 valve area\par \par As per note from Dr Abbasi:\par CT of the head  - showed no acute pathology\par 24 hour holter - showed PVC\par Carotid duplex - negative for carotid stenosis\par Persantine -Thallium nuclear stress test - negative

## 2020-10-19 NOTE — ED ADULT NURSE NOTE - CCCP TRG CHIEF CMPLNT
REASON FOR VISIT:  Fissure in the left thumb nail plate        INTERVAL EVENTS:  The patient has been evaluated in the [facility] by Mary Ellen Colmenares, nurse practitioner.  Several months ago the patient noticed a spontaneous development of a crack in a longitudinal fashion down the length of the nail plate.  She had some tenderness under the nail bed as well.  There is concern for possible glomus tumor as a digital mucous cyst was not palpable or visible.  Patient had an MRI study performed at an outside to shin.  Were not able to visualize the images but the report states that no mass or glomus tumor was identified within the nail bed or distal aspect of the left thumb.  In the interim, the patient states that nail plate seems to be growing normally at this point.  The area of cracking is only the distal 3-4 mm of the nail plate.  Everything proximal to that seems be normal.  The patient does not have pain.  She has not had any discoloration of the nail plate or the underlying nail bed.  She has not had any cyst like drainage.    EXAM:  Examination of the left thumb demonstrates skin that is warm and dry.  No soft tissue swelling or mass.  Certainly no cyst surrounding the interphalangeal joint.  The patient has a nail plate has a slight fissure in its mid aspect at the very distal tip.  The proximal portion of the nail plate appears normal.  No discoloration or swelling underneath the nail plate.  Full range of motion without discomfort.    ASSESSMENT:  History of left thumb nail plate fissure that is now resolved.  No other abnormalities in terms of symptoms or clinical examination.    PLAN:  I recommend the patient follow-up as needed if additional nail plate deformities, pain, discoloration or drainage occur.       abdominal pain

## 2021-10-06 PROBLEM — I10 ESSENTIAL HYPERTENSION: Status: ACTIVE | Noted: 2020-08-02

## 2021-12-14 ENCOUNTER — EMERGENCY (EMERGENCY)
Facility: HOSPITAL | Age: 86
LOS: 0 days | Discharge: HOME | End: 2021-12-14
Attending: EMERGENCY MEDICINE | Admitting: EMERGENCY MEDICINE
Payer: MEDICARE

## 2021-12-14 VITALS
HEART RATE: 63 BPM | SYSTOLIC BLOOD PRESSURE: 128 MMHG | DIASTOLIC BLOOD PRESSURE: 60 MMHG | TEMPERATURE: 98 F | WEIGHT: 134.92 LBS | HEIGHT: 63 IN | OXYGEN SATURATION: 97 % | RESPIRATION RATE: 16 BRPM

## 2021-12-14 DIAGNOSIS — Z91.013 ALLERGY TO SEAFOOD: ICD-10-CM

## 2021-12-14 DIAGNOSIS — E03.9 HYPOTHYROIDISM, UNSPECIFIED: ICD-10-CM

## 2021-12-14 DIAGNOSIS — E78.5 HYPERLIPIDEMIA, UNSPECIFIED: ICD-10-CM

## 2021-12-14 DIAGNOSIS — I10 ESSENTIAL (PRIMARY) HYPERTENSION: ICD-10-CM

## 2021-12-14 DIAGNOSIS — N81.4 UTEROVAGINAL PROLAPSE, UNSPECIFIED: ICD-10-CM

## 2021-12-14 DIAGNOSIS — Z91.010 ALLERGY TO PEANUTS: ICD-10-CM

## 2021-12-14 DIAGNOSIS — M54.50 LOW BACK PAIN, UNSPECIFIED: ICD-10-CM

## 2021-12-14 DIAGNOSIS — Z79.82 LONG TERM (CURRENT) USE OF ASPIRIN: ICD-10-CM

## 2021-12-14 DIAGNOSIS — Z90.49 ACQUIRED ABSENCE OF OTHER SPECIFIED PARTS OF DIGESTIVE TRACT: ICD-10-CM

## 2021-12-14 DIAGNOSIS — E11.9 TYPE 2 DIABETES MELLITUS WITHOUT COMPLICATIONS: ICD-10-CM

## 2021-12-14 DIAGNOSIS — Z90.49 ACQUIRED ABSENCE OF OTHER SPECIFIED PARTS OF DIGESTIVE TRACT: Chronic | ICD-10-CM

## 2021-12-14 PROCEDURE — 99284 EMERGENCY DEPT VISIT MOD MDM: CPT

## 2021-12-14 NOTE — ED PROVIDER NOTE - ATTENDING CONTRIBUTION TO CARE
patient with several years of uterine prolapse without any complaints. now lives with daughter who first noticed her prolapse so brought her here for evaluation, patient has no complaints, no urinary or defecation troubles. has not noticed it herself get bigger or smaller. She also endorse left lower back pain for months without aggravating or alleviating factors. at this moment she has no pain and no associated symptoms. exam shows prolapse that is reducible without erythema or tenderness, no discharge, cn 2-12 intact, gait is nml, strength and sensation nml patient to fu with GYN for further management.

## 2021-12-14 NOTE — ED PROVIDER NOTE - OBJECTIVE STATEMENT
87 yo F with PMH of HTN, HLD, DM, diverticulitis s/p partial colon resection 2017 presenting for uterine prolapse and back pain. Per patient, prolapse has been present without issues for ~20 years, but daughter just noticed this weekend when she moved in with her. Denies incontinence/retention, dysuria, hematuria, melena, hematochezia, NVD, abdominal pain, fever. Back pain is sharp, intermittent, R lower lumbar, non-radiating. No trauma, numbness, weakness, tingling, headache, vision changes, dizziness, CP, SOB.

## 2021-12-14 NOTE — ED PROVIDER NOTE - PATIENT PORTAL LINK FT
You can access the FollowMyHealth Patient Portal offered by [facility] by registering at the following website: [email or web address]. By joining [facility]’s FollowMyHealth portal, you will also be able to view your health information using other applications (apps) compatible with our system.

## 2021-12-14 NOTE — ED PROVIDER NOTE - CLINICAL SUMMARY MEDICAL DECISION MAKING FREE TEXT BOX
evaluated for uterine prolapse. patient without any complaints, patient to fu with GYN as outpatient.  for back pain likely msk patient to fu with PCP

## 2021-12-14 NOTE — ED ADULT TRIAGE NOTE - CHIEF COMPLAINT QUOTE
Prolapsed uterus for years, pts daughter states it looks like there is an ulcer on it. Pt complains of back pain.

## 2021-12-14 NOTE — ED PROVIDER NOTE - NSFOLLOWUPINSTRUCTIONS_ED_ALL_ED_FT
Back Pain    Back pain is very common in adults. The cause of back pain is rarely dangerous and the pain often gets better over time. The cause of your back pain may not be known and may include strain of muscles or ligaments, degeneration of the spinal disks, or arthritis. Occasionally the pain may radiate down your leg(s). Over-the-counter medicines to reduce pain and inflammation are often the most helpful. Stretching and remaining active frequently helps the healing process.     SEEK IMMEDIATE MEDICAL CARE IF YOU HAVE ANY OF THE FOLLOWING SYMPTOMS: bowel or bladder control problems, unusual weakness or numbness in your arms or legs, nausea or vomiting, abdominal pain, fever, dizziness/lightheadedness.      Uterine Prolapse    WHAT YOU NEED TO KNOW:    A uterine prolapse is a condition that causes your uterus to slip down into your vagina. Prolapse can happen if the tissues and muscles supporting your uterus become weak or damaged.             DISCHARGE INSTRUCTIONS:    Seek care immediately if:   •You have bleeding from your vagina that does not stop.      •You have a mass coming out of your vagina that you cannot push back in.      •You are unable to urinate or have a bowel movement.      •You have severe abdominal pain.      Call your doctor or gynecologist if:   •You are leaking urine or bowel movement.      •You have a fever.      •You have foul-smelling fluid coming from your vagina.      •You see blood coming from your vagina that is not from your monthly period.      •You have questions or concerns about your condition or care.      Medicines:   •Estrogen may help strengthen the pelvic muscles and keep the prolapse from getting worse. This may be taken as a pill, applied as a cream, or inserted into your vagina.       •Take your medicine as directed. Contact your healthcare provider if you think your medicine is not helping or if you have side effects. Tell him or her if you are allergic to any medicine. Keep a list of the medicines, vitamins, and herbs you take. Include the amounts, and when and why you take them. Bring the list or the pill bottles to follow-up visits. Carry your medicine list with you in case of an emergency.      Pessary care: A pessary is a rubber device shaped like a donut. It helps to hold your uterus in place. If your gynecologist fits you for a pessary, you will need to remove and clean it regularly. You will be taught when and how to clean the pessary.    Manage your symptoms:   •Sit with your legs elevated. This can help relieve pain or discomfort. Put pillows or blankets under your ankles to elevate your entire legs.      •Do Kegel exercises. These exercises strengthen the muscles that hold your uterus in place. They also tighten the muscles you use when you urinate or have a bowel movement. Tighten muscles in your pelvis (muscles you use to stop urinating). Hold the muscles tight for 5 seconds, then relax for 5 seconds. Gradually work up to holding the muscles contracted for 10 seconds. Do at least 3 sets of 10 repetitions a day.      •Do not strain. Do not lift heavy objects, stand for long periods of time, or strain to have a bowel movement. Prevent constipation by drinking plenty of liquids and eating foods high in fiber. Ask how much liquid to drink every day. High-fiber foods include fresh fruits, vegetables, and whole grains.      •Maintain a healthy weight. Ask your healthcare provider for a healthy weight for you. Ask him or her to help you create a weight loss plan if you are overweight. He or she can also help you create an exercise program. Exercise helps your bowels work better and decreases pressure inside your colon.  Walking for Exercise       Follow up with your doctor or gynecologist as directed: You may need to return regularly to have your pessary checked. You may also need to see your gynecologist for possible surgery. Write down your questions so you remember to ask them during your visits.

## 2021-12-14 NOTE — ED PROVIDER NOTE - PHYSICAL EXAMINATION
CONSTITUTIONAL: well-appearing, in NAD  SKIN: Warm dry, normal skin turgor  HEAD: NCAT  EYES: EOMI, PERRLA, no scleral icterus, conjunctiva pink  ENT: normal pharynx with no erythema or exudates  NECK: Supple; non tender. Full ROM.  CARD: RRR, no murmurs.  RESP: clear to ausculation b/l. No crackles or wheezing.  ABD: soft, non-tender, non-distended, no rebound or guarding.  : chaperone Dr. Verma; uterine prolapsed 3 cm outside of vagina, non-tender, nonerythematous, no lesions  EXT: Full ROM, no bony tenderness, no pedal edema, no calf tenderness  NEURO: normal motor. normal sensory. CN II-XII intact. Cerebellar testing normal. Normal gait.  PSYCH: Cooperative, appropriate.

## 2021-12-14 NOTE — ED ADULT TRIAGE NOTE - BP NONINVASIVE SYSTOLIC (MM HG)
128 Rifampin Counseling: I discussed with the patient the risks of rifampin including but not limited to liver damage, kidney damage, red-orange body fluids, nausea/vomiting and severe allergy.

## 2022-06-16 ENCOUNTER — APPOINTMENT (OUTPATIENT)
Dept: UROGYNECOLOGY | Facility: CLINIC | Age: 87
End: 2022-06-16
Payer: MEDICARE

## 2022-06-16 VITALS
HEART RATE: 76 BPM | BODY MASS INDEX: 24.1 KG/M2 | SYSTOLIC BLOOD PRESSURE: 122 MMHG | HEIGHT: 63 IN | WEIGHT: 136 LBS | DIASTOLIC BLOOD PRESSURE: 72 MMHG

## 2022-06-16 VITALS
WEIGHT: 165 LBS | BODY MASS INDEX: 29.23 KG/M2 | HEART RATE: 65 BPM | SYSTOLIC BLOOD PRESSURE: 151 MMHG | HEIGHT: 63 IN | DIASTOLIC BLOOD PRESSURE: 79 MMHG

## 2022-06-16 DIAGNOSIS — N81.3 COMPLETE UTEROVAGINAL PROLAPSE: ICD-10-CM

## 2022-06-16 DIAGNOSIS — Z87.898 PERSONAL HISTORY OF OTHER SPECIFIED CONDITIONS: ICD-10-CM

## 2022-06-16 DIAGNOSIS — K57.32 DIVERTICULITIS OF LARGE INTESTINE W/OUT PERFORATION OR ABSCESS W/OUT BLEEDING: ICD-10-CM

## 2022-06-16 DIAGNOSIS — R39.11 HESITANCY OF MICTURITION: ICD-10-CM

## 2022-06-16 DIAGNOSIS — Z86.79 PERSONAL HISTORY OF OTHER DISEASES OF THE CIRCULATORY SYSTEM: ICD-10-CM

## 2022-06-16 DIAGNOSIS — K57.30 DIVERTICULOSIS OF LARGE INTESTINE W/OUT PERFORATION OR ABSCESS W/OUT BLEEDING: ICD-10-CM

## 2022-06-16 DIAGNOSIS — N95.0 POSTMENOPAUSAL BLEEDING: ICD-10-CM

## 2022-06-16 LAB
BILIRUB UR QL STRIP: NEGATIVE
CLARITY UR: CLEAR
COLLECTION METHOD: NORMAL
GLUCOSE UR-MCNC: NEGATIVE
HCG UR QL: 0.2 EU/DL
HGB UR QL STRIP.AUTO: NEGATIVE
KETONES UR-MCNC: NEGATIVE
LEUKOCYTE ESTERASE UR QL STRIP: NEGATIVE
NITRITE UR QL STRIP: NEGATIVE
PH UR STRIP: 6
PROT UR STRIP-MCNC: NEGATIVE
SP GR UR STRIP: 1.01

## 2022-06-16 PROCEDURE — 81003 URINALYSIS AUTO W/O SCOPE: CPT | Mod: QW

## 2022-06-16 PROCEDURE — 99205 OFFICE O/P NEW HI 60 MIN: CPT | Mod: 25

## 2022-06-16 PROCEDURE — 51701 INSERT BLADDER CATHETER: CPT

## 2022-06-16 RX ORDER — QUINAPRIL HYDROCHLORIDE 40 MG/1
40 TABLET, FILM COATED ORAL
Refills: 0 | Status: COMPLETED | COMMUNITY
End: 2022-06-16

## 2022-06-16 RX ORDER — LISINOPRIL 40 MG/1
40 TABLET ORAL
Qty: 90 | Refills: 0 | Status: ACTIVE | COMMUNITY
Start: 2022-03-08

## 2022-06-16 RX ORDER — SERTRALINE 25 MG/1
25 TABLET, FILM COATED ORAL
Qty: 90 | Refills: 0 | Status: ACTIVE | COMMUNITY
Start: 2022-01-21

## 2022-06-16 NOTE — DISCUSSION/SUMMARY
[FreeTextEntry1] : \par Postmenopausal bleeding-\par  Advised further workup with a pelvic ultrasound to make sure the uterine lining is thin. Advised the patient  and her daughter that without a workup there is always a risk of uterine cancer or precancer that is not being diagnosed. The patient's daughter voiced understanding and agrees to the workup.\par \par Uterovaginal prolapse complete-\par The patient and her daughter was counseled regarding the possible natural progression of prolapse and the clinical consequences of worsening prolapse. The stage and the location of the prolapse was reviewed with the patient. She was counseled regarding the management strategies including observation or pessary placement. The patient's daughter voiced understanding and agrees with observation for now.\par \par Urinary hesitancy-\par Will send the urine for testing to rule out infectious etiology.\par Advised the patient and her daughter that the patient needs to start drinking 3 cups of water per day. The urine today was very concentrated and the patient does not remember the last time she voided so based on the amount of urine collected from the bladder, she is dehydrated. \par

## 2022-06-16 NOTE — HISTORY OF PRESENT ILLNESS
[FreeTextEntry1] : \par Pt with pelvic floor dysfunction here for urogynecologic evaluation. She describes: \par \par Chief PFD: patient- not bothered by anything\par \par Bleeding of unknown location, blood in the underwear.\par Has not had the guaiac testing done yet, but the daughter has it at home\par has a gastroenterology appointment next week\par Patient has intermittent confusion, lives alone, refuses and aid, will allow her two daughters to help\par Is not bothered by anything. The daughters want to know what is going on with the bleeding\par \par Pelvic organ prolapse: history of ischemic bowel disease s/p lysis of adhesions/bowel resection, +bulge, for years, not bothersome to the patient, denies splinting\par Stress urinary incontinence: denies\par Overactive bladder syndrome: voids three times per day, no sensation to void in between, min incontinence, no glaucoma\par Voiding dysfunction: yes Incomplete bladder emptying, yes hesitancy \par Lower urinary tract/vaginal symptoms: no recurrent UTIs per year, as above hematuria, no dysuria, no bladder pain \par Fecal incontinence: pictures of stool and blood in the underwear\par Defecatory dysfunction: soft\par Sexual dysfunction: not active\par Pelvic pain: denies\par Vaginal dryness: denies\par \par Her pelvic floor symptoms are significantly bothersome and negatively impacting her quality of life. \par \par

## 2022-06-16 NOTE — COUNSELING
[FreeTextEntry1] : \par We will notify you of the urine results\par \par Please start drinking 3 cups of plain water per day\par \par Please schedule the pelvic sonogram to make sure that the lining of the womb is normal. Please call my office to let us know that it is scheduled so that we can followup on the report\par [facility]\par [address] [place] [address]\par \par \par Please see the gastroenterologist to rule out blood in the stool\par \par Please apply vaseline to the bulge to help decrease cuts or irritation on the skin\par \par Call with any issues\par \par Followup will be scheduled based on the urine results and the sonogram results

## 2022-06-16 NOTE — PHYSICAL EXAM
[Chaperone Present] : A chaperone was present in the examining room during all aspects of the physical examination [FreeTextEntry1] : Void: 0 cc unable to void and had no sensation to void\par PVR: 50 cc\par Urethra was prepped in sterile fashion and then a sterile catheter (14F) was used by me to drain the bladder. The patient tolerated the procedure well.\par \par GH:  4 (measured once the prolapse was already reduced)   pB: 3  TVL: 8  C: +10  D: -2  Aa: +3 Ba: +10 Ap: +3 Bp: +10\par  \par Well healed incision: umbilical, laparoscopic\par normal perineal sensation\par absent perineal reflexes\par negative cough stress test with and without reduction\par positive atrophy\par positive urethral caruncle\par positive urethral hypermobility\par no active bleeding in the vagina\par no urethral tenderness\par no bladder tenderness\par no cervical tenderness\par no uterine tenderness\par thin rectovaginal septum\par poor rectal squeeze\par ? blood on the glove after rectal exam\par

## 2022-06-17 LAB
APPEARANCE: CLEAR
BILIRUBIN URINE: NEGATIVE
BLOOD URINE: NEGATIVE
COLOR: YELLOW
GLUCOSE QUALITATIVE U: NEGATIVE
KETONES URINE: NEGATIVE
LEUKOCYTE ESTERASE URINE: NEGATIVE
NITRITE URINE: NEGATIVE
PH URINE: 6.5
PROTEIN URINE: NORMAL
SPECIFIC GRAVITY URINE: 1.02
UROBILINOGEN URINE: NORMAL

## 2022-06-18 LAB — BACTERIA UR CULT: NORMAL

## 2022-06-20 ENCOUNTER — NON-APPOINTMENT (OUTPATIENT)
Age: 87
End: 2022-06-20

## 2022-07-01 ENCOUNTER — EMERGENCY (EMERGENCY)
Facility: HOSPITAL | Age: 87
LOS: 0 days | Discharge: HOME | End: 2022-07-01
Attending: EMERGENCY MEDICINE | Admitting: STUDENT IN AN ORGANIZED HEALTH CARE EDUCATION/TRAINING PROGRAM

## 2022-07-01 VITALS
SYSTOLIC BLOOD PRESSURE: 138 MMHG | HEART RATE: 73 BPM | OXYGEN SATURATION: 98 % | TEMPERATURE: 98 F | DIASTOLIC BLOOD PRESSURE: 67 MMHG

## 2022-07-01 VITALS
SYSTOLIC BLOOD PRESSURE: 132 MMHG | WEIGHT: 136.03 LBS | TEMPERATURE: 98 F | OXYGEN SATURATION: 96 % | RESPIRATION RATE: 16 BRPM | DIASTOLIC BLOOD PRESSURE: 61 MMHG | HEIGHT: 63 IN | HEART RATE: 72 BPM

## 2022-07-01 DIAGNOSIS — K57.12 DIVERTICULITIS OF SMALL INTESTINE WITHOUT PERFORATION OR ABSCESS WITHOUT BLEEDING: ICD-10-CM

## 2022-07-01 DIAGNOSIS — Z91.010 ALLERGY TO PEANUTS: ICD-10-CM

## 2022-07-01 DIAGNOSIS — S22.069A UNSPECIFIED FRACTURE OF T7-T8 VERTEBRA, INITIAL ENCOUNTER FOR CLOSED FRACTURE: ICD-10-CM

## 2022-07-01 DIAGNOSIS — E11.9 TYPE 2 DIABETES MELLITUS WITHOUT COMPLICATIONS: ICD-10-CM

## 2022-07-01 DIAGNOSIS — Z91.013 ALLERGY TO SEAFOOD: ICD-10-CM

## 2022-07-01 DIAGNOSIS — M54.6 PAIN IN THORACIC SPINE: ICD-10-CM

## 2022-07-01 DIAGNOSIS — E78.5 HYPERLIPIDEMIA, UNSPECIFIED: ICD-10-CM

## 2022-07-01 DIAGNOSIS — Z90.49 ACQUIRED ABSENCE OF OTHER SPECIFIED PARTS OF DIGESTIVE TRACT: Chronic | ICD-10-CM

## 2022-07-01 DIAGNOSIS — N30.90 CYSTITIS, UNSPECIFIED WITHOUT HEMATURIA: ICD-10-CM

## 2022-07-01 DIAGNOSIS — Z87.19 PERSONAL HISTORY OF OTHER DISEASES OF THE DIGESTIVE SYSTEM: ICD-10-CM
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
## 2022-07-01 DIAGNOSIS — K92.1 MELENA: ICD-10-CM
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
## 2022-07-01 DIAGNOSIS — Z79.82 LONG TERM (CURRENT) USE OF ASPIRIN: ICD-10-CM

## 2022-07-01 DIAGNOSIS — R53.1 WEAKNESS: ICD-10-CM

## 2022-07-01 DIAGNOSIS — E86.0 DEHYDRATION: ICD-10-CM
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
## 2022-07-01 DIAGNOSIS — E03.9 HYPOTHYROIDISM, UNSPECIFIED: ICD-10-CM

## 2022-07-01 DIAGNOSIS — I10 ESSENTIAL (PRIMARY) HYPERTENSION: ICD-10-CM

## 2022-07-01 LAB
ALBUMIN SERPL ELPH-MCNC: 3.8 G/DL — SIGNIFICANT CHANGE UP (ref 3.5–5.2)
ALP SERPL-CCNC: 107 U/L — SIGNIFICANT CHANGE UP (ref 30–115)
ALT FLD-CCNC: 16 U/L — SIGNIFICANT CHANGE UP (ref 0–41)
ANION GAP SERPL CALC-SCNC: 9 MMOL/L — SIGNIFICANT CHANGE UP (ref 7–14)
APPEARANCE UR: CLEAR — SIGNIFICANT CHANGE UP
AST SERPL-CCNC: 26 U/L — SIGNIFICANT CHANGE UP (ref 0–41)
BACTERIA # UR AUTO: NEGATIVE — SIGNIFICANT CHANGE UP
BASOPHILS # BLD AUTO: 0.06 K/UL — SIGNIFICANT CHANGE UP (ref 0–0.2)
BASOPHILS NFR BLD AUTO: 0.8 % — SIGNIFICANT CHANGE UP (ref 0–1)
BILIRUB SERPL-MCNC: 0.4 MG/DL — SIGNIFICANT CHANGE UP (ref 0.2–1.2)
BILIRUB UR-MCNC: NEGATIVE — SIGNIFICANT CHANGE UP
BUN SERPL-MCNC: 24 MG/DL — HIGH (ref 10–20)
CALCIUM SERPL-MCNC: 9 MG/DL — SIGNIFICANT CHANGE UP (ref 8.5–10.1)
CHLORIDE SERPL-SCNC: 106 MMOL/L — SIGNIFICANT CHANGE UP (ref 98–110)
CO2 SERPL-SCNC: 24 MMOL/L — SIGNIFICANT CHANGE UP (ref 17–32)
COLOR SPEC: SIGNIFICANT CHANGE UP
CREAT SERPL-MCNC: 1 MG/DL — SIGNIFICANT CHANGE UP (ref 0.7–1.5)
DIFF PNL FLD: ABNORMAL
EGFR: 55 ML/MIN/1.73M2 — LOW
EOSINOPHIL # BLD AUTO: 0.18 K/UL — SIGNIFICANT CHANGE UP (ref 0–0.7)
EOSINOPHIL NFR BLD AUTO: 2.3 % — SIGNIFICANT CHANGE UP (ref 0–8)
EPI CELLS # UR: 2 /HPF — SIGNIFICANT CHANGE UP (ref 0–5)
GLUCOSE SERPL-MCNC: 101 MG/DL — HIGH (ref 70–99)
GLUCOSE UR QL: NEGATIVE — SIGNIFICANT CHANGE UP
HCT VFR BLD CALC: 36.3 % — LOW (ref 37–47)
HGB BLD-MCNC: 12.1 G/DL — SIGNIFICANT CHANGE UP (ref 12–16)
HYALINE CASTS # UR AUTO: 3 /LPF — SIGNIFICANT CHANGE UP (ref 0–7)
IMM GRANULOCYTES NFR BLD AUTO: 0.4 % — HIGH (ref 0.1–0.3)
KETONES UR-MCNC: NEGATIVE — SIGNIFICANT CHANGE UP
LEUKOCYTE ESTERASE UR-ACNC: ABNORMAL
LIDOCAIN IGE QN: 20 U/L — SIGNIFICANT CHANGE UP (ref 7–60)
LYMPHOCYTES # BLD AUTO: 0.95 K/UL — LOW (ref 1.2–3.4)
LYMPHOCYTES # BLD AUTO: 11.9 % — LOW (ref 20.5–51.1)
MCHC RBC-ENTMCNC: 30.9 PG — SIGNIFICANT CHANGE UP (ref 27–31)
MCHC RBC-ENTMCNC: 33.3 G/DL — SIGNIFICANT CHANGE UP (ref 32–37)
MCV RBC AUTO: 92.8 FL — SIGNIFICANT CHANGE UP (ref 81–99)
MONOCYTES # BLD AUTO: 0.57 K/UL — SIGNIFICANT CHANGE UP (ref 0.1–0.6)
MONOCYTES NFR BLD AUTO: 7.2 % — SIGNIFICANT CHANGE UP (ref 1.7–9.3)
NEUTROPHILS # BLD AUTO: 6.17 K/UL — SIGNIFICANT CHANGE UP (ref 1.4–6.5)
NEUTROPHILS NFR BLD AUTO: 77.4 % — HIGH (ref 42.2–75.2)
NITRITE UR-MCNC: NEGATIVE — SIGNIFICANT CHANGE UP
NRBC # BLD: 0 /100 WBCS — SIGNIFICANT CHANGE UP (ref 0–0)
PH UR: 6.5 — SIGNIFICANT CHANGE UP (ref 5–8)
PLATELET # BLD AUTO: 272 K/UL — SIGNIFICANT CHANGE UP (ref 130–400)
POTASSIUM SERPL-MCNC: 5.2 MMOL/L — HIGH (ref 3.5–5)
POTASSIUM SERPL-SCNC: 5.2 MMOL/L — HIGH (ref 3.5–5)
PROT SERPL-MCNC: 6.2 G/DL — SIGNIFICANT CHANGE UP (ref 6–8)
PROT UR-MCNC: NEGATIVE — SIGNIFICANT CHANGE UP
RBC # BLD: 3.91 M/UL — LOW (ref 4.2–5.4)
RBC # FLD: 13.2 % — SIGNIFICANT CHANGE UP (ref 11.5–14.5)
RBC CASTS # UR COMP ASSIST: 5 /HPF — HIGH (ref 0–4)
SODIUM SERPL-SCNC: 139 MMOL/L — SIGNIFICANT CHANGE UP (ref 135–146)
SP GR SPEC: 1.01 — SIGNIFICANT CHANGE UP (ref 1.01–1.03)
UROBILINOGEN FLD QL: SIGNIFICANT CHANGE UP
WBC # BLD: 7.96 K/UL — SIGNIFICANT CHANGE UP (ref 4.8–10.8)
WBC # FLD AUTO: 7.96 K/UL — SIGNIFICANT CHANGE UP (ref 4.8–10.8)
WBC UR QL: 25 /HPF — HIGH (ref 0–5)

## 2022-07-01 PROCEDURE — 71045 X-RAY EXAM CHEST 1 VIEW: CPT | Mod: 26

## 2022-07-01 PROCEDURE — 99285 EMERGENCY DEPT VISIT HI MDM: CPT

## 2022-07-01 PROCEDURE — 74177 CT ABD & PELVIS W/CONTRAST: CPT | Mod: 26,MA

## 2022-07-01 RX ORDER — METRONIDAZOLE 500 MG
500 TABLET ORAL ONCE
Refills: 0 | Status: COMPLETED | OUTPATIENT
Start: 2022-07-01 | End: 2022-07-01

## 2022-07-01 RX ORDER — SODIUM CHLORIDE 9 MG/ML
2000 INJECTION, SOLUTION INTRAVENOUS ONCE
Refills: 0 | Status: COMPLETED | OUTPATIENT
Start: 2022-07-01 | End: 2022-07-01

## 2022-07-01 RX ORDER — CEFTRIAXONE 500 MG/1
1000 INJECTION, POWDER, FOR SOLUTION INTRAMUSCULAR; INTRAVENOUS ONCE
Refills: 0 | Status: COMPLETED | OUTPATIENT
Start: 2022-07-01 | End: 2022-07-01

## 2022-07-01 RX ADMIN — SODIUM CHLORIDE 2000 MILLILITER(S): 9 INJECTION, SOLUTION INTRAVENOUS at 10:31

## 2022-07-01 RX ADMIN — Medication 100 MILLIGRAM(S): at 16:43

## 2022-07-01 RX ADMIN — CEFTRIAXONE 100 MILLIGRAM(S): 500 INJECTION, POWDER, FOR SOLUTION INTRAMUSCULAR; INTRAVENOUS at 16:10

## 2022-07-01 NOTE — ED PROVIDER NOTE - OBJECTIVE STATEMENT
87 year old female with phmx of HTN, HLD, DM, diverticulitis s/p partial colon resection 2017 comes in with daughter for weakness. pt is aoax1 and not complaining of any pain or weakness but daughter states that for the past couple days, pt has been more weak and complaining of mid back pain. pt fell a week ago, no loc, no ac but has not been ambulating as well. pt here ambulated at her baseline and has no other complaints. as per daughter, pt has had decreased po intake for the past week.

## 2022-07-01 NOTE — ED PROVIDER NOTE - ATTENDING CONTRIBUTION TO CARE
86 y/o F pmh DM, HTN, DLD, dementia? p/w back pain and decreased ambulation since mech fall 1 wk ago. [place] chest and [place] [facility] [place]. + decreased PO and generalized weakness. + intermittent blood stools for months, now diarrhea with some blood. No syncope, cp, sob, blood thinners.    Agree w/ exam, Pt is well appearing, has no complaints presently, + dry MM, abd s/nt, ambulates well.    IMP: dehydration, bloody stool, fall and back pain.  P: labs, cxr, ct abd/ pel, ivf, reassess.

## 2022-07-01 NOTE — ED ADULT TRIAGE NOTE - CHIEF COMPLAINT QUOTE
back pain x1 week, difficulty walking, fall last wednesday, bloody diarrhea, decreased po intake, lethargy. As per daughter pt might have dementia.

## 2022-07-01 NOTE — ED PROVIDER NOTE - CLINICAL SUMMARY MEDICAL DECISION MAKING FREE TEXT BOX
patient evaluated for back pain, found ot have diverticulitis and cysitis. treated wth abx, found to have fx t8, nsg evaluated patient and no surgical intervention. patient is ambulatory. Patient to be discharged from ED. Any available test results were discussed with patient and/or family. Verbal instructions given, including instructions to return to ED immediately for any new, worsening, or concerning symptoms. Patient endorsed understanding. Written discharge instructions additionally given, including follow-up plan.

## 2022-07-01 NOTE — ED PROVIDER NOTE - NSFOLLOWUPCLINICS_GEN_ALL_ED_FT
[facility] ([facility])  Rehabilitation  [facility] [address] [place] [address]  Phone: [phone]  Fax:     [facility] ([facility])  Rehabilitation  [address] [place] [id number]  Phone: [phone]  Fax:

## 2022-07-01 NOTE — ED PROVIDER NOTE - PATIENT PORTAL LINK FT
You can access the FollowMyHealth Patient Portal offered by [facility] by registering at the following website: [email or web address]. By joining [facility]’s FollowMyHealth portal, you will also be able to view your health information using other applications (apps) compatible with our system.

## 2022-07-01 NOTE — CONSULT NOTE ADULT - SUBJECTIVE AND OBJECTIVE BOX
HPI: 86 y/o female s/p fall last week, patient complaining to family about back pain radiating to side, patient appears to have mild pain when twisting, standing, sitting upright and to aggressive palpation of spine. Patient is able to ambulate but is slightly unsteady, holding on to stretcher and table. No leg pain or weakness.    PAST MEDICAL & SURGICAL HISTORY:  HTN (hypertension)    Dyslipidemia    Hypothyroidism    History of colon resection      Home Medications:  amLODIPine 5 mg oral tablet: 1 tab(s) orally once a day (2020 08:07)  aspirin 81 mg oral tablet: 1 tab(s) orally once a day (2020 08:07)  levothyroxine 50 mcg (0.05 mg) oral tablet: 1 tab(s) orally once a day (2020 08:07)  pravastatin 20 mg oral tablet: 1 tab(s) orally once a day (2020 08:07)  quinapril 40 mg oral tablet: 1 tab(s) orally once a day (2020 08:07)    Allergies    No Known Drug Allergies  Originally Entered as [Unknown] reaction to [shrimp] (Unknown)  peanuts (Unknown)  shellfish (Unknown)    Intolerances    MEDICATIONS  (STANDING):    MEDICATIONS  (PRN):    ICU Vital Signs Last 24 Hrs  T(C): 36.4 (2022 15:44), Max: 36.7 (2022 09:35)  T(F): 97.6 (2022 15:44), Max: 98 (2022 09:35)  HR: 73 (2022 15:44) (72 - 73)  BP: 138/67 (2022 15:44) (132/61 - 138/67)  BP(mean): --  ABP: --  ABP(mean): --  RR: 16 (2022 09:35) (16 - 16)  SpO2: 98% (2022 15:44) (96% - 98%)    I&O's Detail    CBC Full  -  ( 2022 10:25 )  WBC Count : 7.96 K/uL  RBC Count : 3.91 M/uL  Hemoglobin : 12.1 g/dL  Hematocrit : 36.3 %  Platelet Count - Automated : 272 K/uL  Mean Cell Volume : 92.8 fL  Mean Cell Hemoglobin : 30.9 pg  Mean Cell Hemoglobin Concentration : 33.3 g/dL  Auto Neutrophil # : 6.17 K/uL  Auto Lymphocyte # : 0.95 K/uL  Auto Monocyte # : 0.57 K/uL  Auto Eosinophil # : 0.18 K/uL  Auto Basophil # : 0.06 K/uL  Auto Neutrophil % : 77.4 %  Auto Lymphocyte % : 11.9 %  Auto Monocyte % : 7.2 %  Auto Eosinophil % : 2.3 %  Auto Basophil % : 0.8 %        139  |  106  |  24<H>  ----------------------------<  101<H>  5.2<H>   |  24  |  1.0    Ca    9.0      2022 10:25    TPro  6.2  /  Alb  3.8  /  TBili  0.4  /  DBili  x   /  AST  26  /  ALT  16  /  AlkPhos  107        Urinalysis Basic - ( 2022 12:40 )    Color: Light Yellow / Appearance: Clear / S.010 / pH: x  Gluc: x / Ketone: Negative  / Bili: Negative / Urobili: <2 mg/dL   Blood: x / Protein: Negative / Nitrite: Negative   Leuk Esterase: Large / RBC: 5 /HPF / WBC 25 /HPF   Sq Epi: x / Non Sq Epi: 2 /HPF / Bacteria: Negative    AAOX3. Verbal function intact  tongue midline, facial motions symmetric  PERRLA, EOMI  Pronator Drift: neg  Finger to Nose intact, coordination good  Motor: MAEx4, 5/5 power in b/l UE and LE  Sensation: intact to touch in all extremities  no pain or weakness on straight leg raise    Imaging: < from: CT Abdomen and Pelvis w/ IV Cont (22 @ 12:59) >  IMPRESSION:  Sigmoid diverticulosis with a focal area of wall thickening and adjacent   inflammatory changes suspicious for acute diverticulitis. No evidence of   abscess or loculated fluid collection.    Moderate compression deformity of the T8 vertebral body, new (when   compared to CT abdomen 2019)    Atherosclerosis with ectasia of the infrarenal abdominal aorta, mildly   increased since prior exam      < end of copied text >    Assessment/Plan discussed with attending, recommend conservative management with abdominal binder for support, pain management, PT, OT. No surgical intervention.

## 2022-07-01 NOTE — ED PROVIDER NOTE - PHYSICAL EXAMINATION
CONSTITUTIONAL: Well-developed; poorly nourished; in no acute distress.   SKIN: warm, dry  HEAD: Normocephalic; atraumatic.  EYES: PERRL, EOMI, normal sclera and conjunctiva   ENT: No nasal discharge; airway clear.  NECK: Supple; non tender.  CARD:  Regular rate and rhythm.   RESP: NO inc WOB   ABD: soft ntnd  EXT: Normal ROM.    LYMPH: No acute cervical adenopathy.  NEURO: Alert, oriented, grossly unremarkable  PSYCH: Cooperative, appropriate.

## 2022-07-06 ENCOUNTER — APPOINTMENT (OUTPATIENT)
Dept: ANTEPARTUM | Facility: CLINIC | Age: 87
End: 2022-07-06

## 2023-02-17 ENCOUNTER — INPATIENT (INPATIENT)
Facility: HOSPITAL | Age: 88
LOS: 4 days | Discharge: HOME HEALTH | DRG: 689 | End: 2023-02-22
Attending: STUDENT IN AN ORGANIZED HEALTH CARE EDUCATION/TRAINING PROGRAM | Admitting: STUDENT IN AN ORGANIZED HEALTH CARE EDUCATION/TRAINING PROGRAM
Payer: MEDICARE

## 2023-02-17 VITALS
SYSTOLIC BLOOD PRESSURE: 144 MMHG | RESPIRATION RATE: 20 BRPM | WEIGHT: 143.96 LBS | HEART RATE: 82 BPM | OXYGEN SATURATION: 99 % | DIASTOLIC BLOOD PRESSURE: 78 MMHG | TEMPERATURE: 98 F

## 2023-02-17 DIAGNOSIS — R41.82 ALTERED MENTAL STATUS, UNSPECIFIED: ICD-10-CM

## 2023-02-17 DIAGNOSIS — Z90.49 ACQUIRED ABSENCE OF OTHER SPECIFIED PARTS OF DIGESTIVE TRACT: Chronic | ICD-10-CM

## 2023-02-17 LAB
ALBUMIN SERPL ELPH-MCNC: 4.3 G/DL — SIGNIFICANT CHANGE UP (ref 3.5–5.2)
ALP SERPL-CCNC: 77 U/L — SIGNIFICANT CHANGE UP (ref 30–115)
ALT FLD-CCNC: 21 U/L — SIGNIFICANT CHANGE UP (ref 0–41)
ANION GAP SERPL CALC-SCNC: 12 MMOL/L — SIGNIFICANT CHANGE UP (ref 7–14)
APPEARANCE UR: ABNORMAL
AST SERPL-CCNC: 21 U/L — SIGNIFICANT CHANGE UP (ref 0–41)
BACTERIA # UR AUTO: ABNORMAL
BASOPHILS # BLD AUTO: 0.07 K/UL — SIGNIFICANT CHANGE UP (ref 0–0.2)
BASOPHILS NFR BLD AUTO: 0.8 % — SIGNIFICANT CHANGE UP (ref 0–1)
BILIRUB SERPL-MCNC: 0.4 MG/DL — SIGNIFICANT CHANGE UP (ref 0.2–1.2)
BILIRUB UR-MCNC: NEGATIVE — SIGNIFICANT CHANGE UP
BUN SERPL-MCNC: 21 MG/DL — HIGH (ref 10–20)
CALCIUM SERPL-MCNC: 9.9 MG/DL — SIGNIFICANT CHANGE UP (ref 8.4–10.5)
CHLORIDE SERPL-SCNC: 106 MMOL/L — SIGNIFICANT CHANGE UP (ref 98–110)
CO2 SERPL-SCNC: 26 MMOL/L — SIGNIFICANT CHANGE UP (ref 17–32)
COLOR SPEC: YELLOW — SIGNIFICANT CHANGE UP
CREAT SERPL-MCNC: 0.9 MG/DL — SIGNIFICANT CHANGE UP (ref 0.7–1.5)
DIFF PNL FLD: NEGATIVE — SIGNIFICANT CHANGE UP
EGFR: 62 ML/MIN/1.73M2 — SIGNIFICANT CHANGE UP
EOSINOPHIL # BLD AUTO: 0.28 K/UL — SIGNIFICANT CHANGE UP (ref 0–0.7)
EOSINOPHIL NFR BLD AUTO: 3.4 % — SIGNIFICANT CHANGE UP (ref 0–8)
EPI CELLS # UR: 3 /HPF — SIGNIFICANT CHANGE UP (ref 0–5)
FLUAV AG NPH QL: SIGNIFICANT CHANGE UP
FLUBV AG NPH QL: SIGNIFICANT CHANGE UP
GLUCOSE SERPL-MCNC: 96 MG/DL — SIGNIFICANT CHANGE UP (ref 70–99)
GLUCOSE UR QL: NEGATIVE — SIGNIFICANT CHANGE UP
HCT VFR BLD CALC: 40 % — SIGNIFICANT CHANGE UP (ref 37–47)
HGB BLD-MCNC: 13 G/DL — SIGNIFICANT CHANGE UP (ref 12–16)
HYALINE CASTS # UR AUTO: 5 /LPF — SIGNIFICANT CHANGE UP (ref 0–7)
IMM GRANULOCYTES NFR BLD AUTO: 0.4 % — HIGH (ref 0.1–0.3)
KETONES UR-MCNC: SIGNIFICANT CHANGE UP
LEUKOCYTE ESTERASE UR-ACNC: ABNORMAL
LYMPHOCYTES # BLD AUTO: 1.38 K/UL — SIGNIFICANT CHANGE UP (ref 1.2–3.4)
LYMPHOCYTES # BLD AUTO: 16.7 % — LOW (ref 20.5–51.1)
MAGNESIUM SERPL-MCNC: 2.2 MG/DL — SIGNIFICANT CHANGE UP (ref 1.8–2.4)
MCHC RBC-ENTMCNC: 30.7 PG — SIGNIFICANT CHANGE UP (ref 27–31)
MCHC RBC-ENTMCNC: 32.5 G/DL — SIGNIFICANT CHANGE UP (ref 32–37)
MCV RBC AUTO: 94.3 FL — SIGNIFICANT CHANGE UP (ref 81–99)
MONOCYTES # BLD AUTO: 0.62 K/UL — HIGH (ref 0.1–0.6)
MONOCYTES NFR BLD AUTO: 7.5 % — SIGNIFICANT CHANGE UP (ref 1.7–9.3)
NEUTROPHILS # BLD AUTO: 5.9 K/UL — SIGNIFICANT CHANGE UP (ref 1.4–6.5)
NEUTROPHILS NFR BLD AUTO: 71.2 % — SIGNIFICANT CHANGE UP (ref 42.2–75.2)
NITRITE UR-MCNC: NEGATIVE — SIGNIFICANT CHANGE UP
NRBC # BLD: 0 /100 WBCS — SIGNIFICANT CHANGE UP (ref 0–0)
PH UR: 6.5 — SIGNIFICANT CHANGE UP (ref 5–8)
PLATELET # BLD AUTO: 241 K/UL — SIGNIFICANT CHANGE UP (ref 130–400)
POTASSIUM SERPL-MCNC: 5 MMOL/L — SIGNIFICANT CHANGE UP (ref 3.5–5)
POTASSIUM SERPL-SCNC: 5 MMOL/L — SIGNIFICANT CHANGE UP (ref 3.5–5)
PROT SERPL-MCNC: 6.8 G/DL — SIGNIFICANT CHANGE UP (ref 6–8)
PROT UR-MCNC: SIGNIFICANT CHANGE UP
RBC # BLD: 4.24 M/UL — SIGNIFICANT CHANGE UP (ref 4.2–5.4)
RBC # FLD: 13.3 % — SIGNIFICANT CHANGE UP (ref 11.5–14.5)
RBC CASTS # UR COMP ASSIST: 2 /HPF — SIGNIFICANT CHANGE UP (ref 0–4)
RSV RNA NPH QL NAA+NON-PROBE: SIGNIFICANT CHANGE UP
SARS-COV-2 RNA SPEC QL NAA+PROBE: SIGNIFICANT CHANGE UP
SODIUM SERPL-SCNC: 144 MMOL/L — SIGNIFICANT CHANGE UP (ref 135–146)
SP GR SPEC: 1.02 — SIGNIFICANT CHANGE UP (ref 1.01–1.03)
UROBILINOGEN FLD QL: ABNORMAL
WBC # BLD: 8.28 K/UL — SIGNIFICANT CHANGE UP (ref 4.8–10.8)
WBC # FLD AUTO: 8.28 K/UL — SIGNIFICANT CHANGE UP (ref 4.8–10.8)
WBC UR QL: 35 /HPF — HIGH (ref 0–5)

## 2023-02-17 PROCEDURE — 97163 PT EVAL HIGH COMPLEX 45 MIN: CPT | Mod: GP

## 2023-02-17 PROCEDURE — 93010 ELECTROCARDIOGRAM REPORT: CPT

## 2023-02-17 PROCEDURE — 80061 LIPID PANEL: CPT

## 2023-02-17 PROCEDURE — 71045 X-RAY EXAM CHEST 1 VIEW: CPT | Mod: 26

## 2023-02-17 PROCEDURE — 99497 ADVNCD CARE PLAN 30 MIN: CPT | Mod: 25

## 2023-02-17 PROCEDURE — 36415 COLL VENOUS BLD VENIPUNCTURE: CPT

## 2023-02-17 PROCEDURE — 80053 COMPREHEN METABOLIC PANEL: CPT

## 2023-02-17 PROCEDURE — 85025 COMPLETE CBC W/AUTO DIFF WBC: CPT

## 2023-02-17 PROCEDURE — 99223 1ST HOSP IP/OBS HIGH 75: CPT

## 2023-02-17 PROCEDURE — 84443 ASSAY THYROID STIM HORMONE: CPT

## 2023-02-17 PROCEDURE — 70450 CT HEAD/BRAIN W/O DYE: CPT | Mod: 26,MA

## 2023-02-17 PROCEDURE — 83735 ASSAY OF MAGNESIUM: CPT

## 2023-02-17 RX ORDER — SODIUM CHLORIDE 9 MG/ML
500 INJECTION, SOLUTION INTRAVENOUS
Refills: 0 | Status: DISCONTINUED | OUTPATIENT
Start: 2023-02-17 | End: 2023-02-18

## 2023-02-17 RX ORDER — CEFTRIAXONE 500 MG/1
1000 INJECTION, POWDER, FOR SOLUTION INTRAMUSCULAR; INTRAVENOUS ONCE
Refills: 0 | Status: COMPLETED | OUTPATIENT
Start: 2023-02-17 | End: 2023-02-17

## 2023-02-17 RX ADMIN — SODIUM CHLORIDE 500 MILLILITER(S): 9 INJECTION, SOLUTION INTRAVENOUS at 19:51

## 2023-02-17 RX ADMIN — CEFTRIAXONE 100 MILLIGRAM(S): 500 INJECTION, POWDER, FOR SOLUTION INTRAMUSCULAR; INTRAVENOUS at 22:55

## 2023-02-17 NOTE — ED PROVIDER NOTE - CLINICAL SUMMARY MEDICAL DECISION MAKING FREE TEXT BOX
Patient was signed out to me by Dr. Arboleda pending UA. Patient remained stable in ED. Discussed with patient daughter and son, as per them, patient lives alone with 24 hour care giver. Patient mental status has been getting worse, so had been to PMD and was started on new medication, but patient symptoms got worse, so were concerned about and brought to ED. As per information, patient gets agitated, delusional and does not follow instructions/directions given by care giver, wanted to drive the car, and does not follow safety measures. Discussed with patient, her daughter and son, will admit for further observation, monitoring of mental status, psychiatry and neurology evaluations and safe discharge after /care manager evaluations.   Patient remained hemodynamically stable during the course of ED stay. Discussed with patient about the results of the diagnostic studies. Discussed with admitting physician and MAR, patient is admitted to Medicine for further evaluation and care.

## 2023-02-17 NOTE — ED ADULT TRIAGE NOTE - CHIEF COMPLAINT QUOTE
As per daughter, pt becoming more delusional and confused over past week. hx of dementia. pt started remeron last night and made symptoms worse.

## 2023-02-17 NOTE — ED ADULT NURSE NOTE - NSIMPLEMENTINTERV_GEN_ALL_ED
Implemented All Fall with Harm Risk Interventions:  [place] to call system. Call bell, personal items and telephone within reach. Instruct patient to call for assistance. Room bathroom lighting operational. Non-slip footwear when patient is off stretcher. Physically safe environment: no spills, clutter or unnecessary equipment. Stretcher in lowest position, wheels locked, appropriate side rails in place. Provide visual cue, wrist band, yellow gown, etc. Monitor gait and stability. Monitor for mental status changes and reorient to person, place, and time. Review medications for side effects contributing to fall risk. Reinforce activity limits and safety measures with patient and family. Provide visual clues: red socks.

## 2023-02-17 NOTE — ED PROVIDER NOTE - DIFFERENTIAL DIAGNOSIS
AMS, r/o ICH, R/o electrolyte abnormalities, UTI, Worsening dementia with psychotic features. Differential Diagnosis

## 2023-02-17 NOTE — ED PROVIDER NOTE - ATTENDING CONTRIBUTION TO CARE
I have personally performed a history and physical exam on this patient and personally directed the management of the patient.
15:20

## 2023-02-17 NOTE — ED PROVIDER NOTE - PROGRESS NOTE DETAILS
SS Patient reassessed. Family at bedside. Family is not comfortable with patient returning home with increased agitation at nighttime.   Patient to be discharged from ED. Any available test results were discussed with patient and/or family. Verbal instructions given, including instructions to return to ED immediately for any new, worsening, or concerning symptoms. Patient endorsed understanding. Written discharge instructions additionally given, including follow-up plan.

## 2023-02-17 NOTE — ED PROVIDER NOTE - CARE PROVIDER_API CALL
Ling Almanza  INTERNAL MEDICINE  [address] [place] [address]  Phone: [phone]  Fax: [phone]  Follow Up Time: 1-3 Days

## 2023-02-17 NOTE — ED ADULT NURSE NOTE - OBJECTIVE STATEMENT
Chief Complaint   Patient presents with     RECHECK     BOTOX     Sharmila Sanchez    
As per daughter, pt becoming more delusional and confused over past week. hx of dementia. pt started remeron last night and made symptoms worse. Pt. states she has no complaints at this time.

## 2023-02-17 NOTE — ED PROVIDER NOTE - OBJECTIVE STATEMENT
86 yo f ho htn, hld, hm, dementia presents with ams. Patient coming from home and lives with 24 hour aide. BIB daughters who said patient has been more confused, for last week. Started on remeron yday by PCP Dr Almanza who they saw yday. Was agitated and aggressive to home aide last night. Patient denies cp, cough, urinary freq/urg, nausea, vomiting, diarrhea, abd pain 88 yo f ho htn, hld, hm, dementia presents with ams. Patient coming from home and lives with 24 hour aide. BIB daughters who said patient has been more confused, for last week. Started on remeron yday by PCP Dr Almanza who they saw yday. Was agitated and aggressive to home aide last night. Patient denies cp, cough, urinary freq/urg, nausea, vomiting, diarrhea, abd pain    Family is not comfortable with patient returning home with increased agitation at nighttime. Has not been formally dx with dementia. Had previously seeen neuro and psych. On sertraline and remeron

## 2023-02-17 NOTE — ED ADULT NURSE NOTE - NSHOSCREENINGQ1_ED_ALL_ED
Anesthesia Start and Stop Event Times     Date Time Event    4/24/2019 1216 Anesthesia Start     1258 Anesthesia Stop        Responsible Staff  04/24/19    Name Role Begin End    Jaskaran Beckham M.D. Anesth 1216 1258        Preop Diagnosis (Free Text):  Pre-op Diagnosis     UMBILICAL HERNIA        Preop Diagnosis (Codes):  Diagnosis Information     Diagnosis Code(s):         Post op Diagnosis  Umbilical hernia without obstruction or gangrene      Premium Reason  Non-Premium    Comments:                                                                        No

## 2023-02-18 DIAGNOSIS — H74.92 UNSPECIFIED DISORDER OF LEFT MIDDLE EAR AND MASTOID: Chronic | ICD-10-CM

## 2023-02-18 DIAGNOSIS — Z98.890 OTHER SPECIFIED POSTPROCEDURAL STATES: Chronic | ICD-10-CM

## 2023-02-18 LAB
ALBUMIN SERPL ELPH-MCNC: 3.7 G/DL — SIGNIFICANT CHANGE UP (ref 3.5–5.2)
ALP SERPL-CCNC: 69 U/L — SIGNIFICANT CHANGE UP (ref 30–115)
ALT FLD-CCNC: 18 U/L — SIGNIFICANT CHANGE UP (ref 0–41)
ANION GAP SERPL CALC-SCNC: 13 MMOL/L — SIGNIFICANT CHANGE UP (ref 7–14)
AST SERPL-CCNC: 21 U/L — SIGNIFICANT CHANGE UP (ref 0–41)
BASOPHILS # BLD AUTO: 0.06 K/UL — SIGNIFICANT CHANGE UP (ref 0–0.2)
BASOPHILS NFR BLD AUTO: 0.8 % — SIGNIFICANT CHANGE UP (ref 0–1)
BILIRUB SERPL-MCNC: 0.5 MG/DL — SIGNIFICANT CHANGE UP (ref 0.2–1.2)
BUN SERPL-MCNC: 16 MG/DL — SIGNIFICANT CHANGE UP (ref 10–20)
CALCIUM SERPL-MCNC: 9.4 MG/DL — SIGNIFICANT CHANGE UP (ref 8.4–10.5)
CHLORIDE SERPL-SCNC: 103 MMOL/L — SIGNIFICANT CHANGE UP (ref 98–110)
CHOLEST SERPL-MCNC: 183 MG/DL — SIGNIFICANT CHANGE UP
CO2 SERPL-SCNC: 24 MMOL/L — SIGNIFICANT CHANGE UP (ref 17–32)
CREAT SERPL-MCNC: 0.8 MG/DL — SIGNIFICANT CHANGE UP (ref 0.7–1.5)
EGFR: 72 ML/MIN/1.73M2 — SIGNIFICANT CHANGE UP
EOSINOPHIL # BLD AUTO: 0.24 K/UL — SIGNIFICANT CHANGE UP (ref 0–0.7)
EOSINOPHIL NFR BLD AUTO: 3.1 % — SIGNIFICANT CHANGE UP (ref 0–8)
GLUCOSE SERPL-MCNC: 90 MG/DL — SIGNIFICANT CHANGE UP (ref 70–99)
HCT VFR BLD CALC: 38.7 % — SIGNIFICANT CHANGE UP (ref 37–47)
HDLC SERPL-MCNC: 60 MG/DL — SIGNIFICANT CHANGE UP
HGB BLD-MCNC: 12.5 G/DL — SIGNIFICANT CHANGE UP (ref 12–16)
IMM GRANULOCYTES NFR BLD AUTO: 0.3 % — SIGNIFICANT CHANGE UP (ref 0.1–0.3)
LIPID PNL WITH DIRECT LDL SERPL: 104 MG/DL — HIGH
LYMPHOCYTES # BLD AUTO: 0.85 K/UL — LOW (ref 1.2–3.4)
LYMPHOCYTES # BLD AUTO: 11.1 % — LOW (ref 20.5–51.1)
MAGNESIUM SERPL-MCNC: 2 MG/DL — SIGNIFICANT CHANGE UP (ref 1.8–2.4)
MCHC RBC-ENTMCNC: 30 PG — SIGNIFICANT CHANGE UP (ref 27–31)
MCHC RBC-ENTMCNC: 32.3 G/DL — SIGNIFICANT CHANGE UP (ref 32–37)
MCV RBC AUTO: 92.8 FL — SIGNIFICANT CHANGE UP (ref 81–99)
MONOCYTES # BLD AUTO: 0.65 K/UL — HIGH (ref 0.1–0.6)
MONOCYTES NFR BLD AUTO: 8.5 % — SIGNIFICANT CHANGE UP (ref 1.7–9.3)
NEUTROPHILS # BLD AUTO: 5.87 K/UL — SIGNIFICANT CHANGE UP (ref 1.4–6.5)
NEUTROPHILS NFR BLD AUTO: 76.2 % — HIGH (ref 42.2–75.2)
NON HDL CHOLESTEROL: 123 MG/DL — SIGNIFICANT CHANGE UP
NRBC # BLD: 0 /100 WBCS — SIGNIFICANT CHANGE UP (ref 0–0)
PLATELET # BLD AUTO: 210 K/UL — SIGNIFICANT CHANGE UP (ref 130–400)
POTASSIUM SERPL-MCNC: 4.4 MMOL/L — SIGNIFICANT CHANGE UP (ref 3.5–5)
POTASSIUM SERPL-SCNC: 4.4 MMOL/L — SIGNIFICANT CHANGE UP (ref 3.5–5)
PROT SERPL-MCNC: 6.2 G/DL — SIGNIFICANT CHANGE UP (ref 6–8)
RBC # BLD: 4.17 M/UL — LOW (ref 4.2–5.4)
RBC # FLD: 13.2 % — SIGNIFICANT CHANGE UP (ref 11.5–14.5)
SODIUM SERPL-SCNC: 140 MMOL/L — SIGNIFICANT CHANGE UP (ref 135–146)
TRIGL SERPL-MCNC: 91 MG/DL — SIGNIFICANT CHANGE UP
TSH SERPL-MCNC: 1.88 UIU/ML — SIGNIFICANT CHANGE UP (ref 0.27–4.2)
WBC # BLD: 7.69 K/UL — SIGNIFICANT CHANGE UP (ref 4.8–10.8)
WBC # FLD AUTO: 7.69 K/UL — SIGNIFICANT CHANGE UP (ref 4.8–10.8)

## 2023-02-18 PROCEDURE — 99232 SBSQ HOSP IP/OBS MODERATE 35: CPT

## 2023-02-18 RX ORDER — ATORVASTATIN CALCIUM 80 MG/1
10 TABLET, FILM COATED ORAL AT BEDTIME
Refills: 0 | Status: DISCONTINUED | OUTPATIENT
Start: 2023-02-18 | End: 2023-02-22

## 2023-02-18 RX ORDER — LANOLIN ALCOHOL/MO/W.PET/CERES
3 CREAM (GRAM) TOPICAL AT BEDTIME
Refills: 0 | Status: DISCONTINUED | OUTPATIENT
Start: 2023-02-18 | End: 2023-02-22

## 2023-02-18 RX ORDER — ASPIRIN/CALCIUM CARB/MAGNESIUM 324 MG
1 TABLET ORAL
Qty: 0 | Refills: 0 | DISCHARGE

## 2023-02-18 RX ORDER — CEFTRIAXONE 500 MG/1
1000 INJECTION, POWDER, FOR SOLUTION INTRAMUSCULAR; INTRAVENOUS EVERY 24 HOURS
Refills: 0 | Status: COMPLETED | OUTPATIENT
Start: 2023-02-18 | End: 2023-02-20

## 2023-02-18 RX ORDER — LISINOPRIL 2.5 MG/1
40 TABLET ORAL DAILY
Refills: 0 | Status: DISCONTINUED | OUTPATIENT
Start: 2023-02-18 | End: 2023-02-22

## 2023-02-18 RX ORDER — LISINOPRIL 2.5 MG/1
1 TABLET ORAL
Qty: 0 | Refills: 0 | DISCHARGE

## 2023-02-18 RX ORDER — ASPIRIN/CALCIUM CARB/MAGNESIUM 324 MG
81 TABLET ORAL DAILY
Refills: 0 | Status: DISCONTINUED | OUTPATIENT
Start: 2023-02-18 | End: 2023-02-22

## 2023-02-18 RX ORDER — QUINAPRIL HYDROCHLORIDE 40 MG/1
1 TABLET, FILM COATED ORAL
Qty: 0 | Refills: 0 | DISCHARGE

## 2023-02-18 RX ORDER — AMLODIPINE BESYLATE 2.5 MG/1
5 TABLET ORAL DAILY
Refills: 0 | Status: DISCONTINUED | OUTPATIENT
Start: 2023-02-18 | End: 2023-02-22

## 2023-02-18 RX ORDER — LEVOTHYROXINE SODIUM 125 MCG
50 TABLET ORAL DAILY
Refills: 0 | Status: DISCONTINUED | OUTPATIENT
Start: 2023-02-18 | End: 2023-02-22

## 2023-02-18 RX ORDER — ENOXAPARIN SODIUM 100 MG/ML
40 INJECTION SUBCUTANEOUS EVERY 24 HOURS
Refills: 0 | Status: DISCONTINUED | OUTPATIENT
Start: 2023-02-18 | End: 2023-02-22

## 2023-02-18 RX ORDER — LEVOTHYROXINE SODIUM 125 MCG
1 TABLET ORAL
Qty: 0 | Refills: 0 | DISCHARGE

## 2023-02-18 RX ORDER — AMLODIPINE BESYLATE 2.5 MG/1
1 TABLET ORAL
Qty: 0 | Refills: 0 | DISCHARGE

## 2023-02-18 RX ORDER — SERTRALINE 25 MG/1
25 TABLET, FILM COATED ORAL DAILY
Refills: 0 | Status: DISCONTINUED | OUTPATIENT
Start: 2023-02-18 | End: 2023-02-22

## 2023-02-18 RX ADMIN — ATORVASTATIN CALCIUM 10 MILLIGRAM(S): 80 TABLET, FILM COATED ORAL at 21:38

## 2023-02-18 RX ADMIN — LISINOPRIL 40 MILLIGRAM(S): 2.5 TABLET ORAL at 06:25

## 2023-02-18 RX ADMIN — SERTRALINE 25 MILLIGRAM(S): 25 TABLET, FILM COATED ORAL at 11:16

## 2023-02-18 RX ADMIN — AMLODIPINE BESYLATE 5 MILLIGRAM(S): 2.5 TABLET ORAL at 06:23

## 2023-02-18 RX ADMIN — CEFTRIAXONE 100 MILLIGRAM(S): 500 INJECTION, POWDER, FOR SOLUTION INTRAMUSCULAR; INTRAVENOUS at 21:38

## 2023-02-18 RX ADMIN — Medication 50 MICROGRAM(S): at 06:23

## 2023-02-18 RX ADMIN — Medication 3 MILLIGRAM(S): at 21:38

## 2023-02-18 RX ADMIN — ENOXAPARIN SODIUM 40 MILLIGRAM(S): 100 INJECTION SUBCUTANEOUS at 21:39

## 2023-02-18 RX ADMIN — Medication 81 MILLIGRAM(S): at 11:16

## 2023-02-18 NOTE — H&P ADULT - NSHPLABSRESULTS_GEN_ALL_CORE
LABS:  cret                        13.0   8.28  )-----------( 241      ( 17 Feb 2023 18:31 )             40.0     02-17    144  |  106  |  21<H>  ----------------------------<  96  5.0   |  26  |  0.9    Ca    9.9      17 Feb 2023 18:31  Mg     2.2     02-17    TPro  6.8  /  Alb  4.3  /  TBili  0.4  /  DBili  x   /  AST  21  /  ALT  21  /  AlkPhos  77  02-17      Urinalysis (02.17.23 @ 20:48)   Glucose Qualitative, Urine: Negative   Blood, Urine: Negative   pH Urine: 6.5   Color: Yellow   Urine Appearance: Slightly Turbid   Bilirubin: Negative   Ketone - Urine: Trace   Specific Gravity: 1.021   Protein, Urine: Trace   Urobilinogen: 3 mg/dL   Nitrite: Negative   Leukocyte Esterase Concentration: Large   Urine Microscopic-Add On (NC) (02.17.23 @ 20:48)   Red Blood Cell - Urine: 2 /HPF   White Blood Cell - Urine: 35 /HPF   Hyaline Casts: 5 /LPF   Bacteria: Many   Epithelial Cells: 3 /HPF

## 2023-02-18 NOTE — H&P ADULT - CONVERSATION DETAILS
Pt stated that in the event her heart stops she would not want CPR, chest compressions, intubation. Given her confused speech, her daughter confirmed that these are her wishes as she has stated many times in the past. She has a signed form at [facility] also stating these wishes but did not bring it to the hospital.  A MOLST signed and put into her chart.

## 2023-02-18 NOTE — CONSULT NOTE ADULT - ATTENDING COMMENTS
Visited the patient on 2/19. Patient presented with worsening mental status and noted to have UTI. Currently sleeping, wakes up, oriented to person but not place or time. Moves extremities against gravity, I spoke with her daughter at the bedside and also her other daughter on the phone. Patient was note to have worsening cognition for a while with not remembering the names of familiar faces. Possible undiagnosed dementia. Recommend Brain MRI and REEG. Check B12, TSH and A1C. Likely needs nursing home after hospital discharge and outpatient neuropsychiatric testing for official diagnosis of dementia.

## 2023-02-18 NOTE — H&P ADULT - NSICDXPASTSURGICALHX_GEN_ALL_CORE_FT
PAST SURGICAL HISTORY:  History of colon resection     S/P breast lumpectomy     S/P ear surgery

## 2023-02-18 NOTE — PATIENT PROFILE ADULT - FALL HARM RISK - HARM RISK INTERVENTIONS
Assistance with ambulation/Assistance OOB with selected safe patient handling equipment/Communicate Risk of Fall with Harm to all staff/Discuss with provider need for PT consult/Monitor for mental status changes/Monitor gait and stability/Move patient closer to nurses' station/Provide patient with walking aids - walker, cane, crutches/Reinforce activity limits and safety measures with patient and family/Reorient to person, place and time as needed/Tailored Fall Risk Interventions/Toileting schedule using arm’s reach rule for commode and bathroom/Use of alarms - bed, chair and/or voice tab/Visual Cue: Yellow wristband and red socks/Bed in lowest position, wheels locked, appropriate side rails in place/Call bell, personal items and telephone in reach/Instruct patient to call for assistance before getting out of bed or chair/Non-slip footwear when patient is out of bed/[place] to call system/Physically safe environment - no spills, clutter or unnecessary equipment/Purposeful Proactive Rounding/Room/bathroom lighting operational, light cord in reach

## 2023-02-18 NOTE — PROGRESS NOTE ADULT - ASSESSMENT
88yo F w/ PMH of HTN, HLD, hypothyroidism, suspected dementia (not formally diagnosed) presents for worsening AMS. Also found to have positive UA, which may be contributing to worsening mental status.     IMPRESSION   No Sepsis POA   Metabolic Encephalopathy Likely  Secondary  ot  UTI, Possible Signs Cognitive Decline/dementia?  Delirium  Acutely worsened after taking Remeron 2/16 in the evening, becoming agitated and aggressive  CT H: no acute changes but showed increased prominence of ventricles, cortical sulci, and basal cisterns consistent with volume loss, including atrophy in the medial temporal lobes  Haldol  PRN for  Severe Agitation    UA+ for large LE, many bacteria  CTX  Started  f/u on  Cultures   Hx HTN - Amlodipine, Lisinopril, Known Systolic Murmur   Hx HLD - Statin   Hx Hypothyroidism - Levothyroxine, f/u TSH  follow neurology (as per family request)     Follow up: follow urine cultures, AMS, clinical improvement, neurology, PT.     88yo F w/ PMH of HTN, HLD, hypothyroidism, suspected dementia (not formally diagnosed) presents for worsening AMS. Also found to have positive UA, which may be contributing to worsening mental status.     IMPRESSION   No Sepsis POA   Metabolic Encephalopathy Likely  Secondary  ot  UTI, Possible Signs Cognitive Decline/dementia?  Delirium  Acutely worsened after taking Remeron 2/16 in the evening, becoming agitated and aggressive  CT H: no acute changes but showed increased prominence of ventricles, cortical sulci, and basal cisterns consistent with volume loss, including atrophy in the medial temporal lobes  Haldol  PRN for  Severe Agitation    UA+ for large LE, many bacteria  CTX  Started  f/u on  Cultures   Hx HTN - Amlodipine, Lisinopril, Known Systolic Murmur   Hx HLD - Statin   Hx Hypothyroidism - Levothyroxine, f/u TSH  follow neurology (as per family request)     Follow up: follow urine cultures, AMS, clinical improvement, neurology, PT. melatonin added. if not working can be stopped.

## 2023-02-18 NOTE — H&P ADULT - HISTORY OF PRESENT ILLNESS
88yo F w/ PMH of HTN, HLD, hypothyroidism, suspected dementia (not formally diagnosed) presents for worsening AMS. She lives with a 24hr HHA. Per the pt's daughter, she started to become confused since Feb 2022, worsening over the last week, and was acutely worse the evening of 2/16 after she started taking remeron as prescribed by her PCP. After taking remeron, she was agitated and aggressive. Her daughter said she has generally become worse especially at night where she hears things in the basement or tries to go to the bank in the middle of the night. Her speech has also become more confused and nonsensical at times. She previously saw neurologist Dr. Yeh who did imaging of the brain but did not report any particular findings. The pt denies any fevers, chills, headache, auditory or visual hallucinations, dizziness, nausea, vomiting, sore throat, chest pain, sob, abd pain, diarrhea, constipation, dysuria, or urinary frequency.     In the ED  Vitals T 97.6, /78, HR 82, RR 20, SpO2 99% on RA  Labs unremarkable except for UA +LE and +bacteria  CTH: increased prominence of ventricles, cortical sulci, and basal cisterns consistent with volume loss, including atrophy in the medial temporal lobes. Otherwise no evidence of acute intracranial hemorrhage, acute territorial infarct, mass, or midline shift.     Pt was given 1x dose rocephin, LR 500cc. Ucx sent

## 2023-02-18 NOTE — CONSULT NOTE ADULT - ASSESSMENT
Impression:  88yo F w/ PMH of HTN, HLD, hypothyroidism, suspected dementia for the last year(not formally diagnosed) presents for worsening AMS. She lives with a 24hr HHA. Per the pt's daughter, she started to become confused since Feb 2022, worsening over the last week, and was acutely worse the evening of 2/16 after she started taking remeron as prescribed by her PCP. After taking remeron, she was agitated and aggressive. Labs unremarkable except for UA +LE and +bacteria. CTH revealed increased prominence of ventricles, cortical sulci, and basal cisterns consistent with volume loss, including atrophy in the medial temporal lobes. On exam patient with perseverating and unaware of month and year. Etiology likely acute on chronic cognitive impairment.     Suggestion:  MRI brain without lucila  Routine EEG  Fall precaution  Call with questions [phone]

## 2023-02-18 NOTE — H&P ADULT - NSHPPHYSICALEXAM_GEN_ALL_CORE
GENERAL: NAD, well-groomed, well-developed  HEAD:  Atraumatic, Normocephalic  NECK: Supple, No JVD  NERVOUS SYSTEM:  Alert & Oriented X2 to name and place. Good concentration but answers questions inappropriately. Speech is confused and she has trouble finding words. Sentences were not always coherent. Moves all 4 extremities. L ear hard of hearing.   CHEST/LUNG: Clear to auscultation bilaterally; No rales, rhonchi, wheezing, or rubs  HEART: Regular rate and rhythm. Normal S1/S1. No murmurs, rubs, or gallops  ABDOMEN: Soft, Nontender, Nondistended; Bowel sounds present  EXTREMITIES: no edema in BLE

## 2023-02-18 NOTE — H&P ADULT - ASSESSMENT
86yo F w/ PMH of HTN, HLD, hypothyroidism, suspected dementia (not formally diagnosed) presents for worsening AMS. Also found to have positive UA, which may be contributing to worsening mental status    #AMS  #UTI  - Mental status changes for the past 1 year, worsening in the past week  - Mental status waxes and wanes, better in the morning, worse at night  - Acutely worsened after taking remeron 2/16 in the evening, becoming agitated and aggressive  - Hold remeron for now, c/w home sertraline   - Pt seen by Dr. Yeh neurology previously who did not mention any particular findings on imaging of head, though daughters suspect dementia  - CTH: no acute changes but showed increased prominence of ventricles, cortical sulci, and basal cisterns consistent with volume loss, including atrophy in the medial temporal lobes  - UA+ for large LE, many bacteria  - UTI could contribute to AMS, s/p 1x ceftriaxone in ED  - Start ceftriaxone x3 days  - Ucx sent in ED, pending result    #HTN  - c/w home amlodipine, lisinopril    #HLD  - Takes pravastatin at home  - Therapeutic interchange to atorvastatin 10 while inpt  - Lipid panel    #Hypothyroidism  - c/w home levothyroxine   - TSH    DVT ppx: lovenox  Activity: AAT  Diet: DASH  Code: DNR/DNI, MOLST in chart needs attending signature     Family not comfortable with pt returning home if still agitated/aggressive and confused. Depending on hospital course, family may consider placement for the pt

## 2023-02-18 NOTE — H&P ADULT - ATTENDING COMMENTS
88yo F w/ PMH of HTN, HLD, hypothyroidism, suspected dementia (not formally diagnosed) presents for worsening AMS. Also found to have positive UA, which may be contributing to worsening mental status.    IMPRESSION   No Sepsis POA   Metabolic Encephalopathy Likely  Secondary  ot  UTI, Possible Signs Cognitive Decline   Delirium  Acutely worsened after taking remeron 2/16 in the evening, becoming agitated and aggressive  CT H: no acute changes but showed increased prominence of ventricles, cortical sulci, and basal cisterns consistent with volume loss, including atrophy in the medial temporal lobes  Haldol  PRN for  Sever Agitation    UA+ for large LE, many bacteria  CTX  Started  f/u on  Cultures     Intervention  for NH placement   Hx HTN - Amlodipine, Lisinopril  Hx HLD - Statin   Hx Hypothyroidism - Levothyroxine 88yo F w/ PMH of HTN, HLD, hypothyroidism, suspected dementia (not formally diagnosed) presents for worsening AMS. Also found to have positive UA, which may be contributing to worsening mental status.    VITAL SIGNS: AFebrile, vital signs stable  CONSTITUTIONAL: Well-developed; well-nourished; in no acute distress.  SKIN: Skin exam is warm and dry, no acute rash.  HEAD: Normocephalic; atraumatic.  EYES: Pupils  reactive to light, Extraocular movements intact; conjunctiva and sclera clear.  ENT: No nasal discharge; airway clear. Moist mucus membranes.  NECK: Supple; non tender. No rigidity  CARD: Rregular rate and rhythm. Normal S1, S2; Known Systolic  murmur, gallops, or rubs.  RESP: CT  auscultation bilaterally. No wheezes, rales or rhonchi.  ABD: Abdomen soft; non-tender; non-distended  EXT: Normal ROM. No clubbing, cyanosis or edema.   NEURO: Alert and oriented x 1. No focal deficits.  PSYCH: cooperative, appropriate.     IMPRESSION   No Sepsis POA   Metabolic Encephalopathy Likely  Secondary  ot  UTI, Possible Signs Cognitive Decline   Delirium  Acutely worsened after taking Remeron 2/16 in the evening, becoming agitated and aggressive  CT H: no acute changes but showed increased prominence of ventricles, cortical sulci, and basal cisterns consistent with volume loss, including atrophy in the medial temporal lobes  Haldol  PRN for  Sever Agitation    UA+ for large LE, many bacteria  CTX  Started  f/u on  Cultures     Intervention  for NH placement   Hx HTN - Amlodipine, Lisinopril, Known Systolic Murmur   Hx HLD - Statin   Hx Hypothyroidism - Levothyroxine, f/u TSH      Seen on 02/17/23

## 2023-02-18 NOTE — CONSULT NOTE ADULT - SUBJECTIVE AND OBJECTIVE BOX
Neurology Consult    Patient is a 87y old  Female who presents with a chief complaint of AMS (2023 15:56)      HPI:  88yo F w/ PMH of HTN, HLD, hypothyroidism, suspected dementia (not formally diagnosed) presents for worsening AMS. She lives with a 24hr HHA. Per the pt's daughter, she started to become confused since 2022, worsening over the last week, and was acutely worse the evening of  after she started taking remeron as prescribed by her PCP. After taking remeron, she was agitated and aggressive. Her daughter said she has generally become worse especially at night where she hears things in the basement or tries to go to the bank in the middle of the night. Her speech has also become more confused and nonsensical at times. She previously saw neurologist Dr. Yeh who did imaging of the brain but did not report any particular findings. The pt denies any fevers, chills, headache, auditory or visual hallucinations, dizziness, nausea, vomiting, sore throat, chest pain, sob, abd pain, diarrhea, constipation, dysuria, or urinary frequency.     In the ED  Vitals T 97.6, /78, HR 82, RR 20, SpO2 99% on RA  Labs unremarkable except for UA +LE and +bacteria  CTH: increased prominence of ventricles, cortical sulci, and basal cisterns consistent with volume loss, including atrophy in the medial temporal lobes. Otherwise no evidence of acute intracranial hemorrhage, acute territorial infarct, mass, or midline shift.     Pt was given 1x dose rocephin, LR 500cc. Ucx sent (2023 00:25)      PAST MEDICAL & SURGICAL HISTORY:  HTN (hypertension)      Dyslipidemia      Hypothyroidism      History of colon resection      S/P ear surgery      S/P breast lumpectomy          FAMILY HISTORY:  No pertinent family history (Father, Mother)        Social History: (-) x 3    Allergies    No Known Drug Allergies  Originally Entered as [Unknown] reaction to [shrimp] (Unknown)  peanuts (Unknown)  shellfish (Unknown)    Intolerances        MEDICATIONS  (STANDING):  amLODIPine   Tablet 5 milliGRAM(s) Oral daily  aspirin enteric coated 81 milliGRAM(s) Oral daily  atorvastatin 10 milliGRAM(s) Oral at bedtime  cefTRIAXone   IVPB 1000 milliGRAM(s) IV Intermittent every 24 hours  enoxaparin Injectable 40 milliGRAM(s) SubCutaneous every 24 hours  levothyroxine 50 MICROGram(s) Oral daily  lisinopril 40 milliGRAM(s) Oral daily  melatonin 3 milliGRAM(s) Oral at bedtime  sertraline 25 milliGRAM(s) Oral daily    MEDICATIONS  (PRN):      Vital Signs Last 24 Hrs  T(C): 36.4 (2023 16:09), Max: 36.9 (2023 05:24)  T(F): 97.5 (2023 16:09), Max: 98.5 (2023 05:24)  HR: 97 (2023 16:09) (78 - 97)  BP: 122/72 (2023 16:09) (118/73 - 131/64)  BP(mean): --  RR: 18 (2023 16:09) (18 - 18)  SpO2: 95% (2023 16:09) (95% - 97%)    Parameters below as of 2023 16:09  Patient On (Oxygen Delivery Method): room air        Examination:  General:  Appearance is consistent with chronologic age.  No abnormal facies.  Gross skin survey within normal limits.  No hypomimia  Cognitive/Language:  The patient is oriented to person, place, not time and date.   Recent and remote memory not intact.    Patient perseverating and confabulating.  Language with normal repetition, comprehension and naming.  Nondysarthric.    Eyes: intact VA, VFF however difficulty with naming fingers in visual fields.  EOMI w/o nystagmus, skew or reported double vision.  PERRL.  No ptosis/weakness of eyelid closure.    Face:  Facial sensation normal V1 - 3, no facial asymmetry.    Motor examination:   Normal tone, bulk and range of motion.  No tenderness, twitching, tremors or involuntary movements.  Formal Muscle Strength Testing: (MRC grade R/L) 5/5 UE; 5/5 LE.   Sensory examination:   Intact to light touch in all extremities.  Cerebellum:   FTN/HKS intact with normal GERA in all limbs.  No dysmetria or dysdiadokinesia.  Gait deferred    Labs:   CBC Full  -  ( 2023 08:02 )  WBC Count : 7.69 K/uL  RBC Count : 4.17 M/uL  Hemoglobin : 12.5 g/dL  Hematocrit : 38.7 %  Platelet Count - Automated : 210 K/uL  Mean Cell Volume : 92.8 fL  Mean Cell Hemoglobin : 30.0 pg  Mean Cell Hemoglobin Concentration : 32.3 g/dL  Auto Neutrophil # : 5.87 K/uL  Auto Lymphocyte # : 0.85 K/uL  Auto Monocyte # : 0.65 K/uL  Auto Eosinophil # : 0.24 K/uL  Auto Basophil # : 0.06 K/uL  Auto Neutrophil % : 76.2 %  Auto Lymphocyte % : 11.1 %  Auto Monocyte % : 8.5 %  Auto Eosinophil % : 3.1 %  Auto Basophil % : 0.8 %        140  |  103  |  16  ----------------------------<  90  4.4   |  24  |  0.8    Ca    9.4      2023 08:02  Mg     2.0         TPro  6.2  /  Alb  3.7  /  TBili  0.5  /  DBili  x   /  AST  21  /  ALT  18  /  AlkPhos  69      LIVER FUNCTIONS - ( 2023 08:02 )  Alb: 3.7 g/dL / Pro: 6.2 g/dL / ALK PHOS: 69 U/L / ALT: 18 U/L / AST: 21 U/L / GGT: x             Urinalysis Basic - ( 2023 20:48 )    Color: Yellow / Appearance: Slightly Turbid / S.021 / pH: x  Gluc: x / Ketone: Trace  / Bili: Negative / Urobili: 3 mg/dL   Blood: x / Protein: Trace / Nitrite: Negative   Leuk Esterase: Large / RBC: 2 /HPF / WBC 35 /HPF   Sq Epi: x / Non Sq Epi: 3 /HPF / Bacteria: Many          Neuroimaging:  NCHCT:   < from: CT Head No Cont (23 @ 17:43) >  IMPRESSION:    Compared with the previous scanof 2015, there is increased   prominence of the ventricles, cortical sulci, and basal cisterns   consistent with volume loss compatible. This includes atrophy in the   region of the medial temporal lobes.      No evidence of acute intracranial hemorrhage or acute territorial infarct.    No evidence of mass or midline shift.    < end of copied text >    23 @ 18:10

## 2023-02-19 LAB
ALBUMIN SERPL ELPH-MCNC: 4 G/DL — SIGNIFICANT CHANGE UP (ref 3.5–5.2)
ALP SERPL-CCNC: 74 U/L — SIGNIFICANT CHANGE UP (ref 30–115)
ALT FLD-CCNC: 18 U/L — SIGNIFICANT CHANGE UP (ref 0–41)
ANION GAP SERPL CALC-SCNC: 15 MMOL/L — HIGH (ref 7–14)
AST SERPL-CCNC: 28 U/L — SIGNIFICANT CHANGE UP (ref 0–41)
BASOPHILS # BLD AUTO: 0.05 K/UL — SIGNIFICANT CHANGE UP (ref 0–0.2)
BASOPHILS NFR BLD AUTO: 0.8 % — SIGNIFICANT CHANGE UP (ref 0–1)
BILIRUB SERPL-MCNC: 0.5 MG/DL — SIGNIFICANT CHANGE UP (ref 0.2–1.2)
BUN SERPL-MCNC: 16 MG/DL — SIGNIFICANT CHANGE UP (ref 10–20)
CALCIUM SERPL-MCNC: 9.6 MG/DL — SIGNIFICANT CHANGE UP (ref 8.4–10.5)
CHLORIDE SERPL-SCNC: 102 MMOL/L — SIGNIFICANT CHANGE UP (ref 98–110)
CO2 SERPL-SCNC: 21 MMOL/L — SIGNIFICANT CHANGE UP (ref 17–32)
CREAT SERPL-MCNC: 0.8 MG/DL — SIGNIFICANT CHANGE UP (ref 0.7–1.5)
EGFR: 72 ML/MIN/1.73M2 — SIGNIFICANT CHANGE UP
EOSINOPHIL # BLD AUTO: 0.36 K/UL — SIGNIFICANT CHANGE UP (ref 0–0.7)
EOSINOPHIL NFR BLD AUTO: 5.8 % — SIGNIFICANT CHANGE UP (ref 0–8)
GLUCOSE SERPL-MCNC: 95 MG/DL — SIGNIFICANT CHANGE UP (ref 70–99)
HCT VFR BLD CALC: 41.2 % — SIGNIFICANT CHANGE UP (ref 37–47)
HGB BLD-MCNC: 13.1 G/DL — SIGNIFICANT CHANGE UP (ref 12–16)
IMM GRANULOCYTES NFR BLD AUTO: 0.3 % — SIGNIFICANT CHANGE UP (ref 0.1–0.3)
LYMPHOCYTES # BLD AUTO: 0.9 K/UL — LOW (ref 1.2–3.4)
LYMPHOCYTES # BLD AUTO: 14.4 % — LOW (ref 20.5–51.1)
MAGNESIUM SERPL-MCNC: 2.1 MG/DL — SIGNIFICANT CHANGE UP (ref 1.8–2.4)
MCHC RBC-ENTMCNC: 29.6 PG — SIGNIFICANT CHANGE UP (ref 27–31)
MCHC RBC-ENTMCNC: 31.8 G/DL — LOW (ref 32–37)
MCV RBC AUTO: 93 FL — SIGNIFICANT CHANGE UP (ref 81–99)
MONOCYTES # BLD AUTO: 0.44 K/UL — SIGNIFICANT CHANGE UP (ref 0.1–0.6)
MONOCYTES NFR BLD AUTO: 7 % — SIGNIFICANT CHANGE UP (ref 1.7–9.3)
NEUTROPHILS # BLD AUTO: 4.48 K/UL — SIGNIFICANT CHANGE UP (ref 1.4–6.5)
NEUTROPHILS NFR BLD AUTO: 71.7 % — SIGNIFICANT CHANGE UP (ref 42.2–75.2)
NRBC # BLD: 0 /100 WBCS — SIGNIFICANT CHANGE UP (ref 0–0)
PLATELET # BLD AUTO: 201 K/UL — SIGNIFICANT CHANGE UP (ref 130–400)
POTASSIUM SERPL-MCNC: 4.5 MMOL/L — SIGNIFICANT CHANGE UP (ref 3.5–5)
POTASSIUM SERPL-SCNC: 4.5 MMOL/L — SIGNIFICANT CHANGE UP (ref 3.5–5)
PROT SERPL-MCNC: 6.6 G/DL — SIGNIFICANT CHANGE UP (ref 6–8)
RBC # BLD: 4.43 M/UL — SIGNIFICANT CHANGE UP (ref 4.2–5.4)
RBC # FLD: 13.2 % — SIGNIFICANT CHANGE UP (ref 11.5–14.5)
SODIUM SERPL-SCNC: 138 MMOL/L — SIGNIFICANT CHANGE UP (ref 135–146)
WBC # BLD: 6.25 K/UL — SIGNIFICANT CHANGE UP (ref 4.8–10.8)
WBC # FLD AUTO: 6.25 K/UL — SIGNIFICANT CHANGE UP (ref 4.8–10.8)

## 2023-02-19 PROCEDURE — 99221 1ST HOSP IP/OBS SF/LOW 40: CPT

## 2023-02-19 PROCEDURE — 99232 SBSQ HOSP IP/OBS MODERATE 35: CPT

## 2023-02-19 RX ADMIN — Medication 50 MICROGRAM(S): at 05:04

## 2023-02-19 RX ADMIN — SERTRALINE 25 MILLIGRAM(S): 25 TABLET, FILM COATED ORAL at 12:01

## 2023-02-19 RX ADMIN — LISINOPRIL 40 MILLIGRAM(S): 2.5 TABLET ORAL at 05:03

## 2023-02-19 RX ADMIN — ATORVASTATIN CALCIUM 10 MILLIGRAM(S): 80 TABLET, FILM COATED ORAL at 21:23

## 2023-02-19 RX ADMIN — AMLODIPINE BESYLATE 5 MILLIGRAM(S): 2.5 TABLET ORAL at 05:04

## 2023-02-19 RX ADMIN — Medication 3 MILLIGRAM(S): at 21:23

## 2023-02-19 RX ADMIN — CEFTRIAXONE 100 MILLIGRAM(S): 500 INJECTION, POWDER, FOR SOLUTION INTRAMUSCULAR; INTRAVENOUS at 21:22

## 2023-02-19 RX ADMIN — ENOXAPARIN SODIUM 40 MILLIGRAM(S): 100 INJECTION SUBCUTANEOUS at 21:23

## 2023-02-19 RX ADMIN — Medication 81 MILLIGRAM(S): at 12:01

## 2023-02-19 NOTE — PROGRESS NOTE ADULT - ASSESSMENT
88yo F w/ PMH of HTN, HLD, hypothyroidism, suspected dementia (not formally diagnosed) presents for worsening AMS. Also found to have positive UA, which may be contributing to worsening mental status.     IMPRESSION   #No Sepsis POA   #Metabolic Encephalopathy Likely  Secondary  ot  UTI,-- urine cx in lab on ceftriaxone   #Delirium -- Acutely worsened after taking Remeron 2/16 in the evening, becoming agitated and aggressive  CT H: no acute changes but showed increased prominence of ventricles, cortical sulci, and basal cisterns consistent with volume loss, including atrophy in the medial temporal lobes  Haldol  PRN for  Severe Agitation --melatonin added-- if not working can be stopped.  neurology wants MRI-- daughter agrees --will order it    Hx HTN - Amlodipine, Lisinopril, Known Systolic Murmur   Hx HLD - Statin   Hx Hypothyroidism - Levothyroxine, f/u TSH--1.88    Spoke with daughter at bedside-- she said patient had MRI before  and has no metal contraindications for MRI    Follow up: follow urine cultures,    MRI brain pending

## 2023-02-20 LAB
-  AMIKACIN: SIGNIFICANT CHANGE UP
-  AMOXICILLIN/CLAVULANIC ACID: SIGNIFICANT CHANGE UP
-  AMPICILLIN/SULBACTAM: SIGNIFICANT CHANGE UP
-  AMPICILLIN: SIGNIFICANT CHANGE UP
-  AZTREONAM: SIGNIFICANT CHANGE UP
-  CEFAZOLIN: SIGNIFICANT CHANGE UP
-  CEFEPIME: SIGNIFICANT CHANGE UP
-  CEFOXITIN: SIGNIFICANT CHANGE UP
-  CEFTRIAXONE: SIGNIFICANT CHANGE UP
-  CEFUROXIME: SIGNIFICANT CHANGE UP
-  CIPROFLOXACIN: SIGNIFICANT CHANGE UP
-  ERTAPENEM: SIGNIFICANT CHANGE UP
-  GENTAMICIN: SIGNIFICANT CHANGE UP
-  IMIPENEM: SIGNIFICANT CHANGE UP
-  LEVOFLOXACIN: SIGNIFICANT CHANGE UP
-  MEROPENEM: SIGNIFICANT CHANGE UP
-  NITROFURANTOIN: SIGNIFICANT CHANGE UP
-  PIPERACILLIN/TAZOBACTAM: SIGNIFICANT CHANGE UP
-  TOBRAMYCIN: SIGNIFICANT CHANGE UP
-  TRIMETHOPRIM/SULFAMETHOXAZOLE: SIGNIFICANT CHANGE UP
ALBUMIN SERPL ELPH-MCNC: 3.6 G/DL — SIGNIFICANT CHANGE UP (ref 3.5–5.2)
ALP SERPL-CCNC: 66 U/L — SIGNIFICANT CHANGE UP (ref 30–115)
ALT FLD-CCNC: 15 U/L — SIGNIFICANT CHANGE UP (ref 0–41)
ANION GAP SERPL CALC-SCNC: 11 MMOL/L — SIGNIFICANT CHANGE UP (ref 7–14)
AST SERPL-CCNC: 19 U/L — SIGNIFICANT CHANGE UP (ref 0–41)
BASOPHILS # BLD AUTO: 0.06 K/UL — SIGNIFICANT CHANGE UP (ref 0–0.2)
BASOPHILS NFR BLD AUTO: 0.9 % — SIGNIFICANT CHANGE UP (ref 0–1)
BILIRUB SERPL-MCNC: 0.4 MG/DL — SIGNIFICANT CHANGE UP (ref 0.2–1.2)
BUN SERPL-MCNC: 23 MG/DL — HIGH (ref 10–20)
CALCIUM SERPL-MCNC: 9.1 MG/DL — SIGNIFICANT CHANGE UP (ref 8.4–10.5)
CHLORIDE SERPL-SCNC: 104 MMOL/L — SIGNIFICANT CHANGE UP (ref 98–110)
CO2 SERPL-SCNC: 26 MMOL/L — SIGNIFICANT CHANGE UP (ref 17–32)
CREAT SERPL-MCNC: 0.8 MG/DL — SIGNIFICANT CHANGE UP (ref 0.7–1.5)
CULTURE RESULTS: SIGNIFICANT CHANGE UP
EGFR: 72 ML/MIN/1.73M2 — SIGNIFICANT CHANGE UP
EOSINOPHIL # BLD AUTO: 0.52 K/UL — SIGNIFICANT CHANGE UP (ref 0–0.7)
EOSINOPHIL NFR BLD AUTO: 7.7 % — SIGNIFICANT CHANGE UP (ref 0–8)
GLUCOSE SERPL-MCNC: 85 MG/DL — SIGNIFICANT CHANGE UP (ref 70–99)
HCT VFR BLD CALC: 38.6 % — SIGNIFICANT CHANGE UP (ref 37–47)
HGB BLD-MCNC: 12.4 G/DL — SIGNIFICANT CHANGE UP (ref 12–16)
IMM GRANULOCYTES NFR BLD AUTO: 0.3 % — SIGNIFICANT CHANGE UP (ref 0.1–0.3)
LYMPHOCYTES # BLD AUTO: 1.48 K/UL — SIGNIFICANT CHANGE UP (ref 1.2–3.4)
LYMPHOCYTES # BLD AUTO: 21.9 % — SIGNIFICANT CHANGE UP (ref 20.5–51.1)
MAGNESIUM SERPL-MCNC: 2.1 MG/DL — SIGNIFICANT CHANGE UP (ref 1.8–2.4)
MCHC RBC-ENTMCNC: 30.2 PG — SIGNIFICANT CHANGE UP (ref 27–31)
MCHC RBC-ENTMCNC: 32.1 G/DL — SIGNIFICANT CHANGE UP (ref 32–37)
MCV RBC AUTO: 93.9 FL — SIGNIFICANT CHANGE UP (ref 81–99)
METHOD TYPE: SIGNIFICANT CHANGE UP
MONOCYTES # BLD AUTO: 0.58 K/UL — SIGNIFICANT CHANGE UP (ref 0.1–0.6)
MONOCYTES NFR BLD AUTO: 8.6 % — SIGNIFICANT CHANGE UP (ref 1.7–9.3)
NEUTROPHILS # BLD AUTO: 4.1 K/UL — SIGNIFICANT CHANGE UP (ref 1.4–6.5)
NEUTROPHILS NFR BLD AUTO: 60.6 % — SIGNIFICANT CHANGE UP (ref 42.2–75.2)
NRBC # BLD: 0 /100 WBCS — SIGNIFICANT CHANGE UP (ref 0–0)
ORGANISM # SPEC MICROSCOPIC CNT: SIGNIFICANT CHANGE UP
ORGANISM # SPEC MICROSCOPIC CNT: SIGNIFICANT CHANGE UP
PLATELET # BLD AUTO: 226 K/UL — SIGNIFICANT CHANGE UP (ref 130–400)
POTASSIUM SERPL-MCNC: 4 MMOL/L — SIGNIFICANT CHANGE UP (ref 3.5–5)
POTASSIUM SERPL-SCNC: 4 MMOL/L — SIGNIFICANT CHANGE UP (ref 3.5–5)
PROT SERPL-MCNC: 6 G/DL — SIGNIFICANT CHANGE UP (ref 6–8)
RBC # BLD: 4.11 M/UL — LOW (ref 4.2–5.4)
RBC # FLD: 12.9 % — SIGNIFICANT CHANGE UP (ref 11.5–14.5)
SODIUM SERPL-SCNC: 141 MMOL/L — SIGNIFICANT CHANGE UP (ref 135–146)
SPECIMEN SOURCE: SIGNIFICANT CHANGE UP
WBC # BLD: 6.76 K/UL — SIGNIFICANT CHANGE UP (ref 4.8–10.8)
WBC # FLD AUTO: 6.76 K/UL — SIGNIFICANT CHANGE UP (ref 4.8–10.8)

## 2023-02-20 PROCEDURE — 99232 SBSQ HOSP IP/OBS MODERATE 35: CPT

## 2023-02-20 RX ADMIN — LISINOPRIL 40 MILLIGRAM(S): 2.5 TABLET ORAL at 05:31

## 2023-02-20 RX ADMIN — Medication 50 MICROGRAM(S): at 05:31

## 2023-02-20 RX ADMIN — AMLODIPINE BESYLATE 5 MILLIGRAM(S): 2.5 TABLET ORAL at 05:31

## 2023-02-20 RX ADMIN — Medication 81 MILLIGRAM(S): at 11:05

## 2023-02-20 RX ADMIN — CEFTRIAXONE 100 MILLIGRAM(S): 500 INJECTION, POWDER, FOR SOLUTION INTRAMUSCULAR; INTRAVENOUS at 22:57

## 2023-02-20 RX ADMIN — ENOXAPARIN SODIUM 40 MILLIGRAM(S): 100 INJECTION SUBCUTANEOUS at 21:53

## 2023-02-20 RX ADMIN — Medication 3 MILLIGRAM(S): at 21:53

## 2023-02-20 RX ADMIN — ATORVASTATIN CALCIUM 10 MILLIGRAM(S): 80 TABLET, FILM COATED ORAL at 21:53

## 2023-02-20 RX ADMIN — SERTRALINE 25 MILLIGRAM(S): 25 TABLET, FILM COATED ORAL at 11:05

## 2023-02-20 NOTE — PHYSICAL THERAPY INITIAL EVALUATION ADULT - GENERAL OBSERVATIONS, REHAB EVAL
920-950 am Chart reviewed. Pt. seen semirecline in bed , in No apparent distress , + IV lock, denies pain, dtr at bedside , Pt. alert and oriented X 2

## 2023-02-20 NOTE — PHYSICAL THERAPY INITIAL EVALUATION ADULT - PERTINENT HX OF CURRENT PROBLEM, REHAB EVAL
88yo F w/ PMH of HTN, HLD, hypothyroidism, suspected dementia (not formally diagnosed) presents for worsening AMS. She lives with a 24hr HHA. Per the pt's daughter, she started to become confused since Feb 2022, worsening over the last week, and was acutely worse the evening of 2/16 after she started taking remeron as prescribed by her PCP. After taking remeron, she was agitated and aggressive. Her daughter said she has generally become worse especially at night where she hears things in the basement or tries to go to the bank in the middle of the night. Her speech has also become more confused and nonsensical at times. She previously saw neurologist Dr. Yeh who did imaging of the brain but did not report any particular findings. The pt denies any fevers, chills, headache, auditory or visual hallucinations, dizziness, nausea, vomiting, sore throat, chest pain, sob, abd pain, diarrhea, constipation, dysuria, or urinary frequency.     Pt. referred to PT for eval and tx.

## 2023-02-20 NOTE — PROGRESS NOTE ADULT - ASSESSMENT
86yo F w/ PMH of HTN, HLD, hypothyroidism, suspected dementia (not formally diagnosed) presents for worsening AMS. Also found to have positive UA, which may be contributing to worsening mental status.     IMPRESSION   #No Sepsis POA   #Metabolic Encephalopathy Likely  Secondary  ot  UTI,--  E Coli on ceftriaxone -- day 3-- patient very alert and smiling today  #Delirium -- Acutely worsened after taking Remeron 2/16 in the evening, becoming agitated and aggressive  CT H: no acute changes but showed increased prominence of ventricles, cortical sulci, and basal cisterns consistent with volume loss, including atrophy in the medial temporal lobes  Haldol  PRN for  Severe Agitation --melatonin added-- if not working can be stopped.  neurology wants MRI-- daughter agrees -- now pending  Hx HTN - Amlodipine, Lisinopril,   Hx HLD - Statin   Hx Hypothyroidism - Levothyroxine, f/u TSH--1.88    Spoke with daughter at bedside-- family still wants MRI    Follow up:     MRI brain pending  may go to SNF-- has 24 hr aide-- private hire

## 2023-02-20 NOTE — PHYSICAL THERAPY INITIAL EVALUATION ADULT - ADDITIONAL COMMENTS
pt rarely uses a straight axis cane at home, also has a rolling walker if needed ;  home health aide 24/7 available for pt.

## 2023-02-21 ENCOUNTER — TRANSCRIPTION ENCOUNTER (OUTPATIENT)
Age: 88
End: 2023-02-21

## 2023-02-21 LAB
ALBUMIN SERPL ELPH-MCNC: 4 G/DL — SIGNIFICANT CHANGE UP (ref 3.5–5.2)
ALP SERPL-CCNC: 72 U/L — SIGNIFICANT CHANGE UP (ref 30–115)
ALT FLD-CCNC: 16 U/L — SIGNIFICANT CHANGE UP (ref 0–41)
ANION GAP SERPL CALC-SCNC: 12 MMOL/L — SIGNIFICANT CHANGE UP (ref 7–14)
AST SERPL-CCNC: 18 U/L — SIGNIFICANT CHANGE UP (ref 0–41)
BASOPHILS # BLD AUTO: 0.06 K/UL — SIGNIFICANT CHANGE UP (ref 0–0.2)
BASOPHILS NFR BLD AUTO: 0.8 % — SIGNIFICANT CHANGE UP (ref 0–1)
BILIRUB SERPL-MCNC: 0.4 MG/DL — SIGNIFICANT CHANGE UP (ref 0.2–1.2)
BUN SERPL-MCNC: 22 MG/DL — HIGH (ref 10–20)
CALCIUM SERPL-MCNC: 9.5 MG/DL — SIGNIFICANT CHANGE UP (ref 8.4–10.5)
CHLORIDE SERPL-SCNC: 105 MMOL/L — SIGNIFICANT CHANGE UP (ref 98–110)
CO2 SERPL-SCNC: 25 MMOL/L — SIGNIFICANT CHANGE UP (ref 17–32)
CREAT SERPL-MCNC: 0.9 MG/DL — SIGNIFICANT CHANGE UP (ref 0.7–1.5)
EGFR: 62 ML/MIN/1.73M2 — SIGNIFICANT CHANGE UP
EOSINOPHIL # BLD AUTO: 0.5 K/UL — SIGNIFICANT CHANGE UP (ref 0–0.7)
EOSINOPHIL NFR BLD AUTO: 6.3 % — SIGNIFICANT CHANGE UP (ref 0–8)
GLUCOSE SERPL-MCNC: 92 MG/DL — SIGNIFICANT CHANGE UP (ref 70–99)
HCT VFR BLD CALC: 38.6 % — SIGNIFICANT CHANGE UP (ref 37–47)
HGB BLD-MCNC: 12.5 G/DL — SIGNIFICANT CHANGE UP (ref 12–16)
IMM GRANULOCYTES NFR BLD AUTO: 0.3 % — SIGNIFICANT CHANGE UP (ref 0.1–0.3)
LYMPHOCYTES # BLD AUTO: 1.86 K/UL — SIGNIFICANT CHANGE UP (ref 1.2–3.4)
LYMPHOCYTES # BLD AUTO: 23.4 % — SIGNIFICANT CHANGE UP (ref 20.5–51.1)
MAGNESIUM SERPL-MCNC: 2.1 MG/DL — SIGNIFICANT CHANGE UP (ref 1.8–2.4)
MCHC RBC-ENTMCNC: 30.1 PG — SIGNIFICANT CHANGE UP (ref 27–31)
MCHC RBC-ENTMCNC: 32.4 G/DL — SIGNIFICANT CHANGE UP (ref 32–37)
MCV RBC AUTO: 93 FL — SIGNIFICANT CHANGE UP (ref 81–99)
MONOCYTES # BLD AUTO: 0.57 K/UL — SIGNIFICANT CHANGE UP (ref 0.1–0.6)
MONOCYTES NFR BLD AUTO: 7.2 % — SIGNIFICANT CHANGE UP (ref 1.7–9.3)
NEUTROPHILS # BLD AUTO: 4.94 K/UL — SIGNIFICANT CHANGE UP (ref 1.4–6.5)
NEUTROPHILS NFR BLD AUTO: 62 % — SIGNIFICANT CHANGE UP (ref 42.2–75.2)
NRBC # BLD: 0 /100 WBCS — SIGNIFICANT CHANGE UP (ref 0–0)
PLATELET # BLD AUTO: 250 K/UL — SIGNIFICANT CHANGE UP (ref 130–400)
POTASSIUM SERPL-MCNC: 4 MMOL/L — SIGNIFICANT CHANGE UP (ref 3.5–5)
POTASSIUM SERPL-SCNC: 4 MMOL/L — SIGNIFICANT CHANGE UP (ref 3.5–5)
PROT SERPL-MCNC: 6.4 G/DL — SIGNIFICANT CHANGE UP (ref 6–8)
RBC # BLD: 4.15 M/UL — LOW (ref 4.2–5.4)
RBC # FLD: 13.1 % — SIGNIFICANT CHANGE UP (ref 11.5–14.5)
SODIUM SERPL-SCNC: 142 MMOL/L — SIGNIFICANT CHANGE UP (ref 135–146)
WBC # BLD: 7.95 K/UL — SIGNIFICANT CHANGE UP (ref 4.8–10.8)
WBC # FLD AUTO: 7.95 K/UL — SIGNIFICANT CHANGE UP (ref 4.8–10.8)

## 2023-02-21 PROCEDURE — 99232 SBSQ HOSP IP/OBS MODERATE 35: CPT

## 2023-02-21 RX ORDER — HALOPERIDOL DECANOATE 100 MG/ML
2 INJECTION INTRAMUSCULAR ONCE
Refills: 0 | Status: COMPLETED | OUTPATIENT
Start: 2023-02-21 | End: 2023-02-21

## 2023-02-21 RX ADMIN — Medication 3 MILLIGRAM(S): at 21:24

## 2023-02-21 RX ADMIN — LISINOPRIL 40 MILLIGRAM(S): 2.5 TABLET ORAL at 06:12

## 2023-02-21 RX ADMIN — ENOXAPARIN SODIUM 40 MILLIGRAM(S): 100 INJECTION SUBCUTANEOUS at 21:24

## 2023-02-21 RX ADMIN — Medication 50 MICROGRAM(S): at 06:12

## 2023-02-21 RX ADMIN — Medication 81 MILLIGRAM(S): at 12:35

## 2023-02-21 RX ADMIN — SERTRALINE 25 MILLIGRAM(S): 25 TABLET, FILM COATED ORAL at 12:35

## 2023-02-21 RX ADMIN — ATORVASTATIN CALCIUM 10 MILLIGRAM(S): 80 TABLET, FILM COATED ORAL at 21:24

## 2023-02-21 RX ADMIN — HALOPERIDOL DECANOATE 2 MILLIGRAM(S): 100 INJECTION INTRAMUSCULAR at 23:05

## 2023-02-21 RX ADMIN — AMLODIPINE BESYLATE 5 MILLIGRAM(S): 2.5 TABLET ORAL at 06:12

## 2023-02-21 NOTE — PROGRESS NOTE ADULT - ASSESSMENT
88yo F w/ PMH of HTN, HLD, hypothyroidism, suspected dementia (not formally diagnosed) presents for worsening AMS. Also found to have positive UA, which may be contributing to worsening mental status.     IMPRESSION   #No Sepsis POA   #Metabolic Encephalopathy Likely  Secondary  ot  UTI,--  E Coli on ceftriaxone -- day 3-- patient very alert and smiling today  #Delirium -- Acutely worsened after taking Remeron 2/16 in the evening, becoming agitated and aggressive  CT H: no acute changes but showed increased prominence of ventricles, cortical sulci, and basal cisterns consistent with volume loss, including atrophy in the medial temporal lobes  Haldol  PRN for  Severe Agitation --melatonin added-- working  for now  neurology wants MRI--  patient could not tolerate it-- spoke with son and daughter and explained that MRI with advanced dementia of no value specially since the patient is alert and awake -- now family agrees and MRI cancelled  Hx HTN - Amlodipine, Lisinopril,   Hx HLD - Statin   Hx Hypothyroidism - Levothyroxine, f/u TSH--1.88    Spoke with son at bedside-- DC planning AM

## 2023-02-21 NOTE — DISCHARGE NOTE NURSING/CASE MANAGEMENT/SOCIAL WORK - NSDCPEPT PROEDMA_GEN_ALL_CORE
Patient is a 43-year-old female presenting to the emergency department via EMS with complaint of generalized weakness. She says for the past couple of days she has been so weak she has been unable to get off her couch. EMS was called by a neighbor. She lives at home alone and EMS report deplorable conditions. There were scattered newspaper and feces on the ground. Patient previously had a couple of cats but no longer has any pets. Since she has not been able to get up, she has not eaten or drank anything and also has been urinating on herself. She says that she has been taking her medications because they were close to her. Patient says that she has some mild pain in both her legs and also some swelling. She has history of DVTs in the past and is on Coumadin. She has had mild rhinorrhea and nausea but denies any shortness of breath, cough, chest pain, vomiting, abdominal pain, urinary symptoms, or fevers. She is not had a bowel movement in a few days. She mentions that she rolled onto the ground 1 week ago but did not injure herself. She denies any headache, neck pain, or arm/leg numbness. The history is provided by the patient and the EMS personnel. Review of Systems   Constitutional: Negative for chills and fever. HENT: Positive for rhinorrhea. Negative for congestion and sore throat. Respiratory: Negative for cough and shortness of breath. Cardiovascular: Positive for leg swelling. Negative for chest pain and palpitations. Gastrointestinal: Negative for abdominal pain, blood in stool, constipation, diarrhea, nausea and vomiting. Genitourinary: Positive for difficulty urinating (cannot get off of couch and has been urinating on self). Negative for dysuria and hematuria. Musculoskeletal: Positive for gait problem (due to weakness) and myalgias (bilateral legs). Negative for back pain and neck pain. Skin: Negative for color change, rash and wound.    Neurological: Positive for weakness (generalized). Negative for dizziness, syncope, light-headedness and headaches. Psychiatric/Behavioral: Negative for confusion. Physical Exam   Constitutional: She is oriented to person, place, and time. She appears well-developed and well-nourished. No distress. Unkept; smells of old urine   HENT:   Head: Normocephalic and atraumatic. Right Ear: External ear normal.   Left Ear: External ear normal.   Nose: Nose normal.   Mouth/Throat: Oropharynx is clear and moist. No oropharyngeal exudate. Dry mucus membranes   Eyes: Pupils are equal, round, and reactive to light. Conjunctivae and EOM are normal. Right eye exhibits no discharge. Left eye exhibits no discharge. No scleral icterus. Neck: Neck supple. Cardiovascular: Regular rhythm, normal heart sounds and intact distal pulses. Exam reveals no gallop and no friction rub. No murmur heard. Tachycardic; Decreased DP pulses 1/4 bilaterally   Pulmonary/Chest: Effort normal and breath sounds normal. No stridor. No respiratory distress. She has no wheezes. She has no rales. She exhibits no tenderness. Abdominal: Soft. Bowel sounds are normal. She exhibits no distension and no mass. There is no tenderness. There is no rebound and no guarding. Musculoskeletal: She exhibits edema (2+ pitting edmea bilat LE) and tenderness (mild generalized discomfor to both legs). Legs weak and she is barely able to lift either leg off the bed; Upper extremity muscle strength 4/5 and equal bilaterally. Neurological: She is alert and oriented to person, place, and time. She exhibits normal muscle tone. Skin: Skin is warm and dry. No rash noted. She is not diaphoretic. No erythema. No pallor. Psychiatric: She has a normal mood and affect.  Her behavior is normal. Judgment and thought content normal.       Procedures    MDM  Number of Diagnoses or Management Options  Failure to thrive in adult:   Generalized weakness:   Hypokalemia:   Hypomagnesemia: Hyponatremia:   Social discord:   Diagnosis management comments: Labs and images ordered and reviewed. Patient had somewhat short abnormalities. Her hyponatremia likely is from alcohol use and seems to be at her baseline. Magnesium and potassium were low and were replaced. Chest x-ray unremarkable. Her alkaline phosphatase was up slightly as well as her bilirubin but she was not having any abdominal pain to palpation. Decision was made not to perform any imaging. Patient was extremely weak during her stay and could not stand. Patient was admitted for further evaluation and treatment. ED Course as of Apr 17 2003 Wed Apr 17, 2019   [facility] and imaging discussed with patient. I asked the patient that she could stand, and she just shakes her head no. She says that she still feels extremely weak. 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 Past Surgical History:  has a past surgical history that includes Dental surgery (2007); Mode tooth extraction; and Upper gastrointestinal endoscopy (N/A, 1/14/2019). Social History:  reports that she quit smoking about 13 years ago. Her smoking use included cigarettes. She started smoking about 47 years ago. She smoked 3.00 packs per day. She has never used smokeless tobacco. She reports that she drinks alcohol. She reports that she does not use drugs. Family History: family history includes Diabetes in her father and sister.      The patients home medications have been reviewed. Allergies:  Other; Pcn [penicillins]; and Erythromycin    ------------------------------------------------ RESULTS ---------------------------------------------------    LABS:  Results for orders placed or performed during the hospital encounter of 04/17/19   CBC Auto Differential   Result Value Ref Range    WBC 6.7 4.5 - 11.5 E9/L    RBC 2.91 (L) 3.50 - 5.50 E12/L    Hemoglobin 10.2 (L) 11.5 - 15.5 g/dL    Hematocrit 28.9 (L) 34.0 - 48.0 %    MCV 99.3 80.0 - 99.9 fL    MCH 35.1 (H) 26.0 - 35.0 pg    MCHC 35.3 (H) 32.0 - 34.5 %    RDW 17.8 (H) 11.5 - 15.0 fL    Platelets 94 (L) 195 - 450 E9/L    MPV 13.1 (H) 7.0 - 12.0 fL    Neutrophils % 53.0 43.0 - 80.0 %    Lymphocytes % 39.0 20.0 - 42.0 %    Monocytes % 6.0 2.0 - 12.0 %    Eosinophils % 0.0 0.0 - 6.0 %    Basophils % 1.0 0.0 - 2.0 %    Neutrophils # 3.62 1.80 - 7.30 E9/L    Lymphocytes # 2.61 1.50 - 4.00 E9/L    Monocytes # 0.40 0.10 - 0.95 E9/L    Eosinophils # 0.00 (L) 0.05 - 0.50 E9/L    Basophils # 0.07 0.00 - 0.20 E9/L    Metamyelocytes Relative 1.0 0.0 - 1.0 %    Smudge Cells 1+     Anisocytosis 1+    Comprehensive Metabolic Panel w/ Reflex to MG   Result Value Ref Range    Sodium 127 (L) 132 - 146 mmol/L    Potassium reflex Magnesium 3.0 (L) 3.5 - 5.0 mmol/L    Chloride 94 (L) 98 - 107 mmol/L    CO2 20 (L) 22 - 29 mmol/L    Anion Gap 13 7 - 16 mmol/L    Glucose 90 74 - 99 mg/dL    BUN 9 8 - 23 mg/dL    CREATININE 0.9 0.5 - 1.0 mg/dL    GFR Non-African American >60 >=60 mL/min/1.73    GFR African American >60     Calcium 7.7 (L) 8.6 - 10.2 mg/dL    Total Protein 5.2 (L) 6.4 - 8.3 g/dL    Alb 2.6 (L) 3.5 - 5.2 g/dL    Total Bilirubin 1.7 (H) 0.0 - 1.2 mg/dL    Alkaline Phosphatase 107 (H) 35 - 104 U/L    ALT 22 0 - 32 U/L    AST 54 (H) 0 - 31 U/L   Troponin   Result Value Ref Range    Troponin <0.01 0.00 - 0.03 ng/mL   Brain Natriuretic Peptide   Result Value Ref Range    Pro-BNP 1,225 (H) 0 - 125 pg/mL   APTT   Result Value Ref Range    aPTT 46.0 (H) 24.5 - 35.1 sec   Protime-INR   Result Value Ref Range    Protime 40.9 (H) 9.3 - 12.4 sec    INR 3.6    CK   Result Value Ref Range    Total  20 - 180 U/L   Urinalysis with Microscopic   Result Value Ref Range    Color, UA LEONIDAS (A) Straw/Yellow    Clarity, UA Clear Clear    Glucose, Ur Negative Negative mg/dL    Bilirubin Urine SMALL (A) Negative    Ketones, Urine Negative Negative mg/dL    Specific Gravity, UA <=1.005 1.005 - 1.030    Blood, Urine Negative Negative    pH, UA 6.5 5.0 - 9.0    Protein, UA Negative Negative mg/dL    Urobilinogen, Urine >=8.0 (A) <2.0 E.U./dL    Nitrite, Urine Negative Negative    Leukocyte Esterase, Urine Negative Negative    WBC, UA NONE 0 - 5 /HPF    RBC, UA NONE 0 - 2 /HPF    Epi Cells RARE /HPF    Bacteria, UA NONE /HPF   Lactic Acid, Plasma   Result Value Ref Range    Lactic Acid 2.9 (H) 0.5 - 2.2 mmol/L   Platelet Confirmation   Result Value Ref Range    Platelet Confirmation CONFIRMED    Magnesium   Result Value Ref Range    Magnesium 1.2 (L) 1.6 - 2.6 mg/dL   Lactic Acid, Plasma   Result Value Ref Range    Lactic Acid 1.8 0.5 - 2.2 mmol/L   EKG 12 Lead   Result Value Ref Range    Ventricular Rate 93 BPM    Atrial Rate 93 BPM    P-R Interval 120 ms    QRS Duration 76 ms    Q-T Interval 428 ms    QTc Calculation (Bazett) 532 ms    P Axis 91 degrees    R Axis 56 degrees    T Axis 122 degrees       RADIOLOGY:    All Radiology results interpreted by Radiologist unless otherwise noted. US DUP LOWER EXTREMITIES BILATERAL VENOUS   Final Result   PATENT DEEP VENOUS SYSTEM OF THE BILATERAL LOWER   EXTREMITY. NO EVIDENCE FOR DVT. The study is limited in the right superficial femoral vein distally   popliteal vein and tibioperoneal trunk vein. XR CHEST PORTABLE   Final Result      Chronic obstructive pulmonary disease      No evidence of airspace consolidation or pulmonary venous congestion.              EKG:  This EKG is signed and interpreted by ED Physician. Time:  1828   Rate: 89  Rhythm: Sinus. Interpretation: Normal axis; nonspecific T-wave changes. Comparison: stable as compared to patient's most recent EKG, 1/12/2019.    ---------------------------- NURSING NOTES AND VITALS REVIEWED -------------------------   The nursing notes within the ED encounter and vital signs as below have been reviewed. /86   Pulse 90   Temp 98.6 °F (37 °C)   Resp 16   Ht 5' 9\" (1.753 m)   Wt 98 lb (44.5 kg)   SpO2 96%   BMI 14.47 kg/m²   Oxygen Saturation Interpretation: Normal      ------------------------------------------PROGRESS NOTES -------------------------------------------    ED COURSE MEDICATIONS:                Medications   magnesium sulfate 2 g in 50 mL IVPB premix (2 g Intravenous New Bag 4/17/19 1823)   potassium chloride (KLOR-CON M) extended release tablet 40 mEq (has no administration in time range)   0.9 % sodium chloride bolus (0 mLs Intravenous Stopped 4/17/19 1842)       CONSULTATIONS:            1930 - Discussed case with Dr. Nancy Ndiaye who agreed to accept admission. COUNSELING:   I have spoken with the patient and discussed todays results, in addition to providing specific details for the plan of care and counseling regarding the diagnosis and prognosis.     --------------------------------------- IMPRESSION & DISPOSITION --------------------------------     IMPRESSION(s):  1. Failure to thrive in adult    2. Hyponatremia    3. Hypokalemia    4. Hypomagnesemia    5. Generalized weakness    6. Social discord        This patient's ED course included: a personal history and physicial examination and re-evaluation prior to disposition    This patient has remained hemodynamically stable during their ED course. DISPOSITION:  Disposition: Admit to telemetry. Patient condition is stable. END OF PROVIDER NOTE.             Arch DO Emy  Resident  04/17/19 2003 Yes

## 2023-02-21 NOTE — DISCHARGE NOTE NURSING/CASE MANAGEMENT/SOCIAL WORK - NSDCPEFALRISK_GEN_ALL_CORE
For information on Fall & Injury Prevention, visit: [email or web address] OR  [email or web address] OR  https://www.cdc.gov/steadi/patient.html

## 2023-02-21 NOTE — DISCHARGE NOTE NURSING/CASE MANAGEMENT/SOCIAL WORK - PATIENT PORTAL LINK FT
You can access the FollowMyHealth Patient Portal offered by [facility] by registering at the following website: [email or web address]. By joining [employer]’s FollowMyHealth portal, you will also be able to view your health information using other applications (apps) compatible with our system.

## 2023-02-22 ENCOUNTER — TRANSCRIPTION ENCOUNTER (OUTPATIENT)
Age: 88
End: 2023-02-22

## 2023-02-22 VITALS
WEIGHT: 90.83 LBS | SYSTOLIC BLOOD PRESSURE: 120 MMHG | TEMPERATURE: 98 F | RESPIRATION RATE: 16 BRPM | DIASTOLIC BLOOD PRESSURE: 75 MMHG | HEART RATE: 89 BPM

## 2023-02-22 PROCEDURE — 99239 HOSP IP/OBS DSCHRG MGMT >30: CPT

## 2023-02-22 RX ORDER — LANOLIN ALCOHOL/MO/W.PET/CERES
1 CREAM (GRAM) TOPICAL
Qty: 30 | Refills: 0
Start: 2023-02-22 | End: 2023-03-23

## 2023-02-22 RX ORDER — SERTRALINE 25 MG/1
1 TABLET, FILM COATED ORAL
Qty: 0 | Refills: 0 | DISCHARGE

## 2023-02-22 RX ORDER — MIRTAZAPINE 45 MG/1
1 TABLET, ORALLY DISINTEGRATING ORAL
Qty: 30 | Refills: 0
Start: 2023-02-22 | End: 2023-03-23

## 2023-02-22 RX ORDER — ATORVASTATIN CALCIUM 80 MG/1
1 TABLET, FILM COATED ORAL
Qty: 0 | Refills: 0 | DISCHARGE
Start: 2023-02-22

## 2023-02-22 RX ORDER — MIRTAZAPINE 45 MG/1
1 TABLET, ORALLY DISINTEGRATING ORAL
Qty: 0 | Refills: 0 | DISCHARGE

## 2023-02-22 RX ADMIN — LISINOPRIL 40 MILLIGRAM(S): 2.5 TABLET ORAL at 07:15

## 2023-02-22 RX ADMIN — AMLODIPINE BESYLATE 5 MILLIGRAM(S): 2.5 TABLET ORAL at 07:15

## 2023-02-22 RX ADMIN — Medication 50 MICROGRAM(S): at 07:15

## 2023-02-22 RX ADMIN — SERTRALINE 25 MILLIGRAM(S): 25 TABLET, FILM COATED ORAL at 12:32

## 2023-02-22 RX ADMIN — Medication 81 MILLIGRAM(S): at 12:32

## 2023-02-22 NOTE — DISCHARGE NOTE PROVIDER - CARE PROVIDER_API CALL
Ling Almanza  INTERNAL MEDICINE  [address] [place] [address]  Phone: [phone]  Fax: [phone]  Follow Up Time:    Ling Almanza  INTERNAL MEDICINE  [address] [place] [address]  Phone: [phone]  Fax: [phone]  Follow Up Time:     Nato Bowens)  Neurology  [address] [place] [address]  Phone: [phone]  Fax: [phone]  Follow Up Time:

## 2023-02-22 NOTE — DISCHARGE NOTE PROVIDER - HOSPITAL COURSE
88yo F w/ PMH of HTN, HLD, hypothyroidism, suspected dementia (not formally diagnosed) presents for worsening AMS. She lives with a 24hr HHA. Per the pt's daughter, she started to become confused since Feb 2022, worsening over the last week, and was acutely worse the evening of 2/16 after she started taking remeron as prescribed by her PCP. After taking remeron, she was agitated and aggressive. Her daughter said she has generally become worse especially at night where she hears things in the basement or tries to go to the bank in the middle of the night. Her speech has also become more confused and nonsensical at times. She previously saw neurologist Dr. Yeh who did imaging of the brain but did not report any particular findings. The pt denies any fevers, chills, headache, auditory or visual hallucinations, dizziness, nausea, vomiting, sore throat, chest pain, sob, abd pain, diarrhea, constipation, dysuria, or urinary frequency.     CT head done:     < from: CT Head No Cont (02.17.23 @ 17:43) >    Compared with the previous scanof 4/8/2015, there is increased   prominence of the ventricles, cortical sulci, and basal cisterns   consistent with volume loss compatible. This includes atrophy in the   region of the medial temporal lobes.      No evidence of acute intracranial hemorrhage or acute territorial infarct.    No evidence of mass or midline shift.    < end of copied text >        UA was positive ,cultures grew E.coli, treated with ceftriaxone. MRI was planned for further workup of AMS, but it was deemed unnecessary since patient has dementia.

## 2023-02-22 NOTE — PROGRESS NOTE ADULT - ASSESSMENT
86yo F w/ PMH of HTN, HLD, hypothyroidism, suspected dementia (not formally diagnosed) presents for worsening AMS. Also found to have positive UA, which may be contributing to worsening mental status.     IMPRESSION   #No Sepsis POA   #Metabolic Encephalopathy Likely  Secondary  ot  UTI,--  E Coli on ceftriaxone -- day 3-- patient very alert and smiling today  #Delirium -- Acutely worsened after taking Remeron 2/16 in the evening, becoming agitated and aggressive  CT H: no acute changes but showed increased prominence of ventricles, cortical sulci, and basal cisterns consistent with volume loss, including atrophy in the medial temporal lobes  --melatonin added--  can do remeron 7.5mg HS  neurology wants MRI--  patient could not tolerate it-- spoke with son and daughter and explained that MRI with advanced dementia of no value specially since the patient is alert and awake -- now family agrees and MRI cancelled  Hx HTN - Amlodipine, Lisinopril,   Hx HLD - Statin   Hx Hypothyroidism - Levothyroxine, f/u TSH--1.88    Spoke with daughter at  bedside-- DC home today--spent more than 30mins

## 2023-02-22 NOTE — DISCHARGE NOTE PROVIDER - PROVIDER TOKENS
PROVIDER:[TOKEN:[[id number]]] PROVIDER:[TOKEN:[[id number]]],PROVIDER:[TOKEN:[[id number]]]

## 2023-02-22 NOTE — DISCHARGE NOTE PROVIDER - NSDCCPCAREPLAN_GEN_ALL_CORE_FT
PRINCIPAL DISCHARGE DIAGNOSIS  Diagnosis: Altered mental status  Assessment and Plan of Treatment: you presented for altered mental status, found to have urinary tract infeciton. treated with antibiotiocs. CT head was done and did not show nay acute changes

## 2023-02-22 NOTE — DISCHARGE NOTE PROVIDER - CARE PROVIDERS DIRECT ADDRESSES
[email or web address]

## 2023-02-22 NOTE — DISCHARGE NOTE PROVIDER - NSDCMRMEDTOKEN_GEN_ALL_CORE_FT
amLODIPine 5 mg oral tablet: 1 tab(s) orally once a day  aspirin 81 mg oral tablet: 1 tab(s) orally once a day  atorvastatin 10 mg oral tablet: 1 tab(s) orally once a day (at bedtime)  levothyroxine 50 mcg (0.05 mg) oral tablet: 1 tab(s) orally once a day  lisinopril 40 mg oral tablet: 1 tab(s) orally once a day  Remeron 15 mg oral tablet: 1 tab(s) orally once a day (at bedtime)  sertraline 25 mg oral tablet: 1 tab(s) orally once a day   amLODIPine 5 mg oral tablet: 1 tab(s) orally once a day  aspirin 81 mg oral tablet: 1 tab(s) orally once a day  atorvastatin 10 mg oral tablet: 1 tab(s) orally once a day (at bedtime)  levothyroxine 50 mcg (0.05 mg) oral tablet: 1 tab(s) orally once a day  lisinopril 40 mg oral tablet: 1 tab(s) orally once a day  Melatonin 5 mg oral tablet: 1 tab(s) orally once a day (at bedtime) -for insomnia   mirtazapine 7.5 mg oral tablet: 1 tab(s) orally once a day (at bedtime)    amLODIPine 5 mg oral tablet: 1 tab(s) orally once a day  aspirin 81 mg oral tablet: 1 tab(s) orally once a day  levothyroxine 50 mcg (0.05 mg) oral tablet: 1 tab(s) orally once a day  lisinopril 40 mg oral tablet: 1 tab(s) orally once a day  Melatonin 5 mg oral tablet: 1 tab(s) orally once a day (at bedtime) -for insomnia   mirtazapine 7.5 mg oral tablet: 1 tab(s) orally once a day (at bedtime)   pravastatin 20 mg oral tablet: 1 tab(s) orally once a day  sertraline 25 mg oral tablet: 1 tab(s) orally once a day

## 2023-02-22 NOTE — PROGRESS NOTE ADULT - SUBJECTIVE AND OBJECTIVE BOX
SUBJECTIVE:    Patient is a 87y old Female who presents with a chief complaint of AMS (22 Feb 2023 08:56)    Currently admitted to medicine with the primary diagnosis of Altered mental status       Today is hospital day 5d.     PAST MEDICAL & SURGICAL HISTORY  HTN (hypertension)    Dyslipidemia    Hypothyroidism    History of colon resection    S/P ear surgery    S/P breast lumpectomy      ALLERGIES:  No Known Drug Allergies  Originally Entered as [Unknown] reaction to [shrimp] (Unknown)  peanuts (Unknown)  shellfish (Unknown)    MEDICATIONS:  STANDING MEDICATIONS  amLODIPine   Tablet 5 milliGRAM(s) Oral daily  aspirin enteric coated 81 milliGRAM(s) Oral daily  atorvastatin 10 milliGRAM(s) Oral at bedtime  enoxaparin Injectable 40 milliGRAM(s) SubCutaneous every 24 hours  levothyroxine 50 MICROGram(s) Oral daily  lisinopril 40 milliGRAM(s) Oral daily  LORazepam   Injectable 1 milliGRAM(s) IV Push once  melatonin 3 milliGRAM(s) Oral at bedtime  sertraline 25 milliGRAM(s) Oral daily    PRN MEDICATIONS    VITALS:   T(F): 98  HR: 63  BP: 131/73  RR: 18  SpO2: --    LABS:                        12.5   7.95  )-----------( 250      ( 21 Feb 2023 06:13 )             38.6     02-21    142  |  105  |  22<H>  ----------------------------<  92  4.0   |  25  |  0.9    Ca    9.5      21 Feb 2023 06:13  Mg     2.1     02-21    TPro  6.4  /  Alb  4.0  /  TBili  0.4  /  DBili  x   /  AST  18  /  ALT  16  /  AlkPhos  72  02-21                  RADIOLOGY:    PHYSICAL EXAM:  GEN: No acute distress  LUNGS: Clear to auscultation bilaterally   HEART: S1/S2 present. RRR.   ABD/ GI: Soft, non-tender, non-distended. Bowel sounds present  EXT: NC/NC/NE/2+PP/JUSTICE  NEURO: AAOX0    
SUBJECTIVE:    Patient is a 87y old Female who presents with a chief complaint of AMS (19 Feb 2023 12:32)    Currently admitted to medicine with the primary diagnosis of Altered mental status       Today is hospital day 3d.     PAST MEDICAL & SURGICAL HISTORY  HTN (hypertension)    Dyslipidemia    Hypothyroidism    History of colon resection    S/P ear surgery    S/P breast lumpectomy      ALLERGIES:  No Known Drug Allergies  Originally Entered as [Unknown] reaction to [shrimp] (Unknown)  peanuts (Unknown)  shellfish (Unknown)    MEDICATIONS:  STANDING MEDICATIONS  amLODIPine   Tablet 5 milliGRAM(s) Oral daily  aspirin enteric coated 81 milliGRAM(s) Oral daily  atorvastatin 10 milliGRAM(s) Oral at bedtime  cefTRIAXone   IVPB 1000 milliGRAM(s) IV Intermittent every 24 hours  enoxaparin Injectable 40 milliGRAM(s) SubCutaneous every 24 hours  levothyroxine 50 MICROGram(s) Oral daily  lisinopril 40 milliGRAM(s) Oral daily  LORazepam   Injectable 1 milliGRAM(s) IV Push once  melatonin 3 milliGRAM(s) Oral at bedtime  sertraline 25 milliGRAM(s) Oral daily    PRN MEDICATIONS    VITALS:   T(F): 96.9  HR: 67  BP: 137/68  RR: 18  SpO2: --    LABS:                        12.4   6.76  )-----------( 226      ( 20 Feb 2023 05:59 )             38.6     02-20    141  |  104  |  23<H>  ----------------------------<  85  4.0   |  26  |  0.8    Ca    9.1      20 Feb 2023 05:59  Mg     2.1     02-20    TPro  6.0  /  Alb  3.6  /  TBili  0.4  /  DBili  x   /  AST  19  /  ALT  15  /  AlkPhos  66  02-20              Culture - Urine (collected 17 Feb 2023 20:48)  Source: Clean Catch Clean Catch (Midstream)  Final Report (20 Feb 2023 15:11):    >100,000 CFU/ml Escherichia coli  Organism: Escherichia coli (20 Feb 2023 15:11)  Organism: Escherichia coli (20 Feb 2023 15:11)          RADIOLOGY:    PHYSICAL EXAM:  GEN: No acute distress  LUNGS: Clear to auscultation bilaterally   HEART: S1/S2 present. RRR.   ABD/ GI: Soft, non-tender, non-distended. Bowel sounds present  EXT: NC/NC/NE/2+PP/JUSTICE  NEURO: Alert and awake today    
SUBJECTIVE:    Patient is a 87y old Female who presents with a chief complaint of AMS (20 Feb 2023 16:43)    Currently admitted to medicine with the primary diagnosis of Altered mental status       Today is hospital day 4d.     PAST MEDICAL & SURGICAL HISTORY  HTN (hypertension)    Dyslipidemia    Hypothyroidism    History of colon resection    S/P ear surgery    S/P breast lumpectomy      ALLERGIES:  No Known Drug Allergies  Originally Entered as [Unknown] reaction to [shrimp] (Unknown)  peanuts (Unknown)  shellfish (Unknown)    MEDICATIONS:  STANDING MEDICATIONS  amLODIPine   Tablet 5 milliGRAM(s) Oral daily  aspirin enteric coated 81 milliGRAM(s) Oral daily  atorvastatin 10 milliGRAM(s) Oral at bedtime  enoxaparin Injectable 40 milliGRAM(s) SubCutaneous every 24 hours  levothyroxine 50 MICROGram(s) Oral daily  lisinopril 40 milliGRAM(s) Oral daily  LORazepam   Injectable 1 milliGRAM(s) IV Push once  melatonin 3 milliGRAM(s) Oral at bedtime  sertraline 25 milliGRAM(s) Oral daily    PRN MEDICATIONS    VITALS:   T(F): 97.4  HR: 79  BP: 114/62  RR: 19  SpO2: --    LABS:                        12.5   7.95  )-----------( 250      ( 21 Feb 2023 06:13 )             38.6     02-21    142  |  105  |  22<H>  ----------------------------<  92  4.0   |  25  |  0.9    Ca    9.5      21 Feb 2023 06:13  Mg     2.1     02-21    TPro  6.4  /  Alb  4.0  /  TBili  0.4  /  DBili  x   /  AST  18  /  ALT  16  /  AlkPhos  72  02-21                  RADIOLOGY:    PHYSICAL EXAM:  GEN: No acute distress  LUNGS: Clear to auscultation bilaterally   HEART: S1/S2 present. RRR.   ABD/ GI: Soft, non-tender, non-distended. Bowel sounds present  EXT: NC/NC/NE/2+PP/JUSTICE  NEURO: AAOX0-- awake and alert    
      INEZ CLARKE  87y, Female  Allergy: No Known Drug Allergies  Originally Entered as [Unknown] reaction to [shrimp] (Unknown)  peanuts (Unknown)  shellfish (Unknown)    Hospital Day: 1d    Patient seen and examined earlier today.   Patient still apears to eb confused.   She was unable to answer any of my questions appropriately. however she was calm.   son at bedside. had a long discussion. apparently patient mental status has been deteriorating for atleast one year. no official diagnosis of dementia.   family requesting neurology work up.       PMH/PSH:  PAST MEDICAL & SURGICAL HISTORY:  HTN (hypertension)      Dyslipidemia      Hypothyroidism      History of colon resection      S/P ear surgery      S/P breast lumpectomy          LAST 24-Hr EVENTS:    VITALS:  Vital Signs Last 24 Hrs  T(C): 36.7 (2023 07:30), Max: 36.9 (2023 05:24)  T(F): 98.1 (2023 07:30), Max: 98.5 (2023 05:24)  HR: 81 (2023 07:30) (78 - 82)  BP: 131/64 (2023 07:30) (118/73 - 144/78)  BP(mean): --  RR: 18 (2023 07:30) (18 - 20)  SpO2: 95% (2023 07:30) (95% - 99%)    Parameters below as of 2023 05:24  Patient On (Oxygen Delivery Method): room air              TESTS & MEASUREMENTS:  Weight/BMI  65.3 (-23 @ 16:40)      LABS:                        12.5   7.69  )-----------( 210      ( 2023 08:02 )             38.7     -18    140  |  103  |  16  ----------------------------<  90  4.4   |  24  |  0.8    Ca    9.4      2023 08:02  Mg     2.0     -    TPro  6.2  /  Alb  3.7  /  TBili  0.5  /  DBili  x   /  AST  21  /  ALT  18  /  AlkPhos  69  02-18                Urinalysis Basic - ( 2023 20:48 )    Color: Yellow / Appearance: Slightly Turbid / S.021 / pH: x  Gluc: x / Ketone: Trace  / Bili: Negative / Urobili: 3 mg/dL   Blood: x / Protein: Trace / Nitrite: Negative   Leuk Esterase: Large / RBC: 2 /HPF / WBC 35 /HPF   Sq Epi: x / Non Sq Epi: 3 /HPF / Bacteria: Many                            RADIOLOGY, ECG, & ADDITIONAL TESTS:    CT Head No Cont:   ACC: [id number] EXAM:  CT BRAIN   ORDERED BY: YAQUELIN HARPER     PROCEDURE DATE:  2023          INTERPRETATION:  CLINICAL HISTORY / REASON FOR EXAM: AMS.    Technique: Noncontrast head CT. Contiguous unenhanced CT axial images of   the head fromthe base to the vertex with coronal and sagittal reformats.    Comparison: CT head dated 2015.    Findings:    Compared with the previous scan of 2015, there is increased   prominence of the ventricles, cortical sulci, and basal cisterns   consistent with volume loss compatible. This includes atrophy in the   region of the medial temporal lobes.    Grey-white matter differentiation is preserved.    Basal ganglia calcification is noted. Carotid siphon calcifications are   noted.    There are patchy hypodensities throughout the hemispheric white matter   without mass effect compatible with chronic microvascular changes.    The fourth ventricle is midline.    There is no acute territorial infarct, intracranial hemorrhage,   extra-axial fluid collection or midline shift.    Calvarium is intact. No evidence of depressed skull fracture.    The visualized paranasal sinuses and right mastoid are well-aerated.   Partial right mastoidectomy.    IMPRESSION:    Compared with the previous scanof 2015, there is increased   prominence of the ventricles, cortical sulci, and basal cisterns   consistent with volume loss compatible. This includes atrophy in the   region of the medial temporal lobes.      No evidence of acute intracranial hemorrhage or acute territorial infarct.    No evidence of mass or midline shift.    --- End of Report ---            CHICHO NIEVES MD; Attending Interventional Radiologist  This document has been electronically signed. 2023  7:28PM (23 @ 17:43)    RECENT DIAGNOSTIC ORDERS:  Diet, DASH/TLC:   Sodium & Cholesterol Restricted (23 @ 01:17)  Thyroid Stimulating Hormone, Serum: AM Sched. Collection: 2023 04:30 (23 @ 01:17)  Magnesium, Serum: Repeat From: 2023 01:15 To: 2023 04:30, Every 1 day(s) (23 @ 01:17)  Comprehensive Metabolic Panel: Repeat From: 2023 01:15 To: 2023 04:30, Every 1 day(s) (23 @ 01:17)  Complete Blood Count + Automated Diff: Repeat From: 2023 01:15 To: 2023 04:30, Every 1 day(s) (23 @ 01:17)  Culture - Urine: Routine  Specimen Source: Clean Catch (Midstream)  Addl Info: If indwelling urinary catheter > 14 days, obtain an order to remove and replace prior to c (23 @ 17:14)      MEDICATIONS:  MEDICATIONS  (STANDING):  amLODIPine   Tablet 5 milliGRAM(s) Oral daily  aspirin enteric coated 81 milliGRAM(s) Oral daily  atorvastatin 10 milliGRAM(s) Oral at bedtime  cefTRIAXone   IVPB 1000 milliGRAM(s) IV Intermittent every 24 hours  enoxaparin Injectable 40 milliGRAM(s) SubCutaneous every 24 hours  levothyroxine 50 MICROGram(s) Oral daily  lisinopril 40 milliGRAM(s) Oral daily  sertraline 25 milliGRAM(s) Oral daily    MEDICATIONS  (PRN):      HOME MEDICATIONS:  amLODIPine 5 mg oral tablet ()  aspirin 81 mg oral tablet ()  levothyroxine 50 mcg (0.05 mg) oral tablet ()  lisinopril 40 mg oral tablet ()  pravastatin 20 mg oral tablet ()  Remeron 15 mg oral tablet ()  sertraline 25 mg oral tablet ()      PHYSICAL EXAM:  GENERAL: Patient lying on bed, comfortable   CHEST/LUNG: NVB, no wheezing   HEART: R1+R2, RRR  ABDOMEN: Soft. non tender, BS positive  EXTREMITIES:  no edema, Bruising  CNS: AAAx1. No cranial nerves deficit.       
SUBJECTIVE:    Patient is a 87y old Female who presents with a chief complaint of AMS (2023 18:09)    Currently admitted to medicine with the primary diagnosis of Altered mental status       Today is hospital day 2d.     PAST MEDICAL & SURGICAL HISTORY  HTN (hypertension)    Dyslipidemia    Hypothyroidism    History of colon resection    S/P ear surgery    S/P breast lumpectomy      ALLERGIES:  No Known Drug Allergies  Originally Entered as [Unknown] reaction to [shrimp] (Unknown)  peanuts (Unknown)  shellfish (Unknown)    MEDICATIONS:  STANDING MEDICATIONS  amLODIPine   Tablet 5 milliGRAM(s) Oral daily  aspirin enteric coated 81 milliGRAM(s) Oral daily  atorvastatin 10 milliGRAM(s) Oral at bedtime  cefTRIAXone   IVPB 1000 milliGRAM(s) IV Intermittent every 24 hours  enoxaparin Injectable 40 milliGRAM(s) SubCutaneous every 24 hours  levothyroxine 50 MICROGram(s) Oral daily  lisinopril 40 milliGRAM(s) Oral daily  melatonin 3 milliGRAM(s) Oral at bedtime  sertraline 25 milliGRAM(s) Oral daily    PRN MEDICATIONS    VITALS:   T(F): 97.9  HR: 65  BP: 126/65  RR: 17  SpO2: 96%    LABS:                        13.1   6.25  )-----------( 201      ( 2023 08:36 )             41.2     02-19    138  |  102  |  16  ----------------------------<  95  4.5   |  21  |  0.8    Ca    9.6      2023 08:36  Mg     2.1     -    TPro  6.6  /  Alb  4.0  /  TBili  0.5  /  DBili  x   /  AST  28  /  ALT  18  /  AlkPhos  74  02-      Urinalysis Basic - ( 2023 20:48 )    Color: Yellow / Appearance: Slightly Turbid / S.021 / pH: x  Gluc: x / Ketone: Trace  / Bili: Negative / Urobili: 3 mg/dL   Blood: x / Protein: Trace / Nitrite: Negative   Leuk Esterase: Large / RBC: 2 /HPF / WBC 35 /HPF   Sq Epi: x / Non Sq Epi: 3 /HPF / Bacteria: Many                RADIOLOGY:    PHYSICAL EXAM:  GEN: No acute distress  LUNGS: Clear to auscultation bilaterally   HEART: S1/S2 present. RRR.   ABD/ GI: Soft, non-tender, non-distended. Bowel sounds present  EXT: NC/NC/NE/2+PP/JUSTICE  NEURO: AAox1

## 2023-02-27 DIAGNOSIS — Z91.013 ALLERGY TO SEAFOOD: ICD-10-CM

## 2023-02-27 DIAGNOSIS — G93.41 METABOLIC ENCEPHALOPATHY: ICD-10-CM

## 2023-02-27 DIAGNOSIS — B96.20 UNSPECIFIED ESCHERICHIA COLI [E. COLI] AS THE CAUSE OF DISEASES CLASSIFIED ELSEWHERE: ICD-10-CM

## 2023-02-27 DIAGNOSIS — Z91.010 ALLERGY TO PEANUTS: ICD-10-CM

## 2023-02-27 DIAGNOSIS — F03.911 UNSPECIFIED DEMENTIA, UNSPECIFIED SEVERITY, WITH AGITATION: ICD-10-CM

## 2023-02-27 DIAGNOSIS — E03.9 HYPOTHYROIDISM, UNSPECIFIED: ICD-10-CM

## 2023-02-27 DIAGNOSIS — I10 ESSENTIAL (PRIMARY) HYPERTENSION: ICD-10-CM

## 2023-02-27 DIAGNOSIS — N39.0 URINARY TRACT INFECTION, SITE NOT SPECIFIED: ICD-10-CM

## 2023-02-27 DIAGNOSIS — E78.5 HYPERLIPIDEMIA, UNSPECIFIED: ICD-10-CM

## 2023-06-15 NOTE — ED PROVIDER NOTE - NSFOLLOWUPCLINICS_GEN_ALL_ED_FT
Detail Level: Detailed Detail Level: Simple Detail Level: Generalized Detail Level: Zone [facility]  OB/GYN  [address] [place] [address]  Phone: [phone]  Fax:   Follow Up Time: 1-3 Days

## 2023-12-20 NOTE — ASU PATIENT PROFILE, ADULT - PSH
Health Maintenance Due   Topic Date Due    DM/CKD Microalbumin  Never done    Shingles Vaccine (1 of 2) Never done    Medicare Advantage- Medicare Wellness Visit  01/01/2023    Influenza Vaccine (1) 09/01/2023    COVID-19 Vaccine (3 - 2023-24 season) 09/01/2023    DM/CKD GFR  10/05/2023    Colorectal Cancer Risk - Colonoscopy  01/18/2024       Patient is due for topics listed above, he wishes to proceed with Immunization(s) Influenza and MWV (Medicare Wellness Visit), but is not proceeding with Immunization(s) COVID-19 and Shingles, Colorectal Cancer Screening: Colonoscopy, DM/CKD Microalbumin, and Glomerular Filtration Rate (GFR) at this time.     Recent PHQ 2/9 Score    PHQ 2:  PHQ 2 Score Adult PHQ 2 Score Adult PHQ 2 Interpretation Little interest or pleasure in activity?   12/20/2023   1:53 AM 3 Further screening needed 1       PHQ 9:  PHQ 9 Score Adult PHQ 9 Score Adult PHQ 9 Interpretation   12/20/2023   1:53 AM 13 Moderate Depression            History of colon resection

## 2024-02-11 ENCOUNTER — NON-APPOINTMENT (OUTPATIENT)
Age: 89
End: 2024-02-11